# Patient Record
Sex: MALE | Race: WHITE | Employment: OTHER | URBAN - METROPOLITAN AREA
[De-identification: names, ages, dates, MRNs, and addresses within clinical notes are randomized per-mention and may not be internally consistent; named-entity substitution may affect disease eponyms.]

---

## 2019-08-09 ENCOUNTER — OFFICE VISIT (OUTPATIENT)
Dept: NEUROLOGY | Age: 29
End: 2019-08-09

## 2019-08-09 VITALS
HEIGHT: 71 IN | DIASTOLIC BLOOD PRESSURE: 72 MMHG | HEART RATE: 84 BPM | RESPIRATION RATE: 16 BRPM | BODY MASS INDEX: 25.06 KG/M2 | SYSTOLIC BLOOD PRESSURE: 116 MMHG | WEIGHT: 179 LBS | OXYGEN SATURATION: 98 %

## 2019-08-09 DIAGNOSIS — G43.011 INTRACTABLE MIGRAINE WITHOUT AURA AND WITH STATUS MIGRAINOSUS: ICD-10-CM

## 2019-08-09 DIAGNOSIS — R56.9 NEW ONSET SEIZURE (HCC): Primary | ICD-10-CM
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
## 2019-08-09 NOTE — PATIENT INSTRUCTIONS
PRESCRIPTION REFILL POLICY [facility] Neurology [facility] Statement to Patients April 1, 2014 In an effort to ensure the large volume of patient prescription refills is processed in the most efficient and expeditious manner, we are asking our patients to assist us by calling your Pharmacy for all prescription refills, this will include also your  Mail Order Pharmacy. The pharmacy will contact our office electronically to continue the refill process. Please do not wait until the last minute to call your pharmacy. We need at least 48 hours (2days) to fill prescriptions. We also encourage you to call your pharmacy before going to  your prescription to make sure it is ready. With regard to controlled substance prescription refill requests (narcotic refills) that need to be picked up at our office, we ask your cooperation by providing us with at least 72 hours (3days) notice that you will need a refill. We will not refill narcotic prescription refill requests after 4:00pm on any weekday, Monday through Thursday, or after 2:00pm on Fridays, or on the weekends. We encourage everyone to explore another way of getting your prescription refill request processed using [employer], our patient web portal through our electronic medical record system. [employer] is an efficient and effective way to communicate your medication request directly to the office and  downloadable as an kieran on your smart phone . [employer] also features a review functionality that allows you to view your medication list as well as leave messages for your physician. Are you ready to get connected? If so please review the attatched instructions or speak to any of our staff to get you set up right away! Thank you so much for your cooperation. Should you have any questions please contact our Practice Administrator. RESULT POLICY If we have ordered testing for you, know that; \"NO NEWS IS GOOD NEWS! \" It is our policy that we know longer call patients with results, nor do we  give test results over the phone. We schedule follow up appointments so that your results can be discussed in person. This allows you to address any questions you have regarding the results. If you choose to go to an imaging center outside of [facility], it is your responsibility to bring imaging report and disc to follow up appointment. If something of concern is revealed on your test, we will contact you to discuss the matter and if needed schedule a sooner follow up appointment. Additionally, results may be found by using the My Chart feature and one of our patient service representatives at the  can give you instructions on how to access this feature to utilize our electronic medical record system. Thank you for your understanding.
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
## 2019-08-30 ENCOUNTER — HOSPITAL ENCOUNTER (OUTPATIENT)
Dept: MRI IMAGING | Age: 29
Discharge: HOME | End: 2019-08-30
Attending: PSYCHIATRY & NEUROLOGY
Payer: MEDICAID

## 2019-08-30 DIAGNOSIS — R56.9 NEW ONSET SEIZURE (HCC): ICD-10-CM

## 2019-08-30 PROCEDURE — A9575 INJ GADOTERATE MEGLUMI 0.1ML: HCPCS | Performed by: PSYCHIATRY & NEUROLOGY

## 2019-08-30 PROCEDURE — 74011250636 HC RX REV CODE- 250/636: Performed by: PSYCHIATRY & NEUROLOGY

## 2019-08-30 PROCEDURE — 70553 MRI BRAIN STEM W/O & W/DYE: CPT

## 2019-08-30 RX ORDER — GADOTERATE MEGLUMINE 376.9 MG/ML
16 INJECTION INTRAVENOUS
Status: COMPLETED | OUTPATIENT
Start: 2019-08-30 | End: 2019-08-30

## 2019-08-30 RX ADMIN — GADOTERATE MEGLUMINE 16 ML: 376.9 INJECTION INTRAVENOUS at 12:50

## 2019-09-20 ENCOUNTER — OFFICE VISIT (OUTPATIENT)
Dept: NEUROLOGY | Age: 29
End: 2019-09-20

## 2019-09-20 DIAGNOSIS — R56.9 NEW ONSET SEIZURE (HCC): Primary | ICD-10-CM

## 2019-10-08 ENCOUNTER — OFFICE VISIT (OUTPATIENT)
Dept: NEUROLOGY | Age: 29
End: 2019-10-08

## 2019-10-08 DIAGNOSIS — R56.9 NEW ONSET SEIZURE (HCC): Primary | ICD-10-CM

## 2019-10-29 ENCOUNTER — OFFICE VISIT (OUTPATIENT)
Dept: NEUROLOGY | Age: 29
End: 2019-10-29

## 2019-10-29 VITALS
RESPIRATION RATE: 18 BRPM | BODY MASS INDEX: 25 KG/M2 | HEIGHT: 71 IN | SYSTOLIC BLOOD PRESSURE: 118 MMHG | WEIGHT: 178.57 LBS | DIASTOLIC BLOOD PRESSURE: 86 MMHG | OXYGEN SATURATION: 97 % | HEART RATE: 75 BPM

## 2019-10-29 DIAGNOSIS — G43.011 INTRACTABLE MIGRAINE WITHOUT AURA AND WITH STATUS MIGRAINOSUS: Primary | ICD-10-CM

## 2019-10-29 DIAGNOSIS — R40.20 LOC (LOSS OF CONSCIOUSNESS) (HCC): ICD-10-CM

## 2019-10-29 NOTE — PROCEDURES
[facility] AT [place]   EEG Report    Date of Service: 10/8/2019  Referring: Dr. Daquan Walker deprived EEG is requested in this 28-year-old man with new onset seizure to evaluate for epileptiform abnormality. Medication list is Adderall Neurontin Vyvanse Imitrex Effexor    This EEG was obtained during the awake, drowsy, and sleeping states. During wakefulness there are very brief intermittent runs of posteriorly dominant and symmetric low to medium amplitude 10 cps activities which attenuate with eye opening. Lower voltage faster frequency activities are seen symmetrically over the anterior head regions. Hyperventilation is performed for 3 minutes and little alters the tracing. Intermittent photic stimulation little alters the tracing. During drowsiness the background rhythms attenuate and are replaced with diffuse symmetric theta range activities. There is a later emergence of symmetric vertex sharp waves and sleep spindles.     Interpretation  The sleep deprived EEG recorded during the awake, drowsy, and sleeping states is normal.    Michelle Kaba MD

## 2019-10-29 NOTE — PROGRESS NOTES
Chief Complaint   Patient presents with    Results     sd and reg eeg    Headache     still 15 avg HAs per mo

## 2019-10-29 NOTE — PROCEDURES
[facility] AT [place]   EEG Report    Date of Service: 9/20/2019  Referring: Dr. Warden Enamorado    An EEG is requested in this 22-year-old with new onset seizure to evaluate for epileptiform abnormalities. Medications said to include Vyvanse Adderall Imitrex Effexor    This tracing is obtained during the awake state. During wakefulness there are brief intermittent runs of posteriorly dominant and symmetric low to medium amplitude 10 cps activities which attenuate with eye opening. Lower voltage faster frequency activities are seen symmetrically over the anterior head regions. Hyperventilation is performed for 3 minutes and little alters the tracing. Intermittent photic stimulation little alters the tracing    Sleep is not attained    Interpretation  This EEG recorded during the awake state is normal.  No epileptiform N normalities are seen.     Shirley Greco MD

## 2019-10-29 NOTE — PROGRESS NOTES
[facility] Neurology Clinics and [address] at [facility] Neurology Clinics at [address] [facility], [address] [facility] [address] [facility], [address] [place] [address]   [phone]              Chief Complaint   Patient presents with    Results     sd and reg eeg    Headache     still 15 avg HAs per mo     Current Outpatient Medications   Medication Sig Dispense Refill    Lisdexamfetamine (VYVANSE) 70 mg capsule Take 70 mg by mouth every morning.  SUMAtriptan (IMITREX) 50 mg tablet Take 50 mg by mouth once as needed for Migraine.  venlafaxine (EFFEXOR) 75 mg tablet Take 75 mg by mouth daily.  dextroamphetamine-amphetamine (ADDERALL) 10 mg tablet Take 10 mg by mouth two (2) times a day.  gabapentin (NEURONTIN) 600 mg tablet Take 1,200 mg by mouth three (3) times daily. No Known Allergies  Social History     Tobacco Use    Smoking status: Current Some Day Smoker    Smokeless tobacco: Never Used   Substance Use Topics    Alcohol use: Yes    Drug use: Yes     Types: Marijuana     Comment: \"not in a few months\"   Patient returns today for follow-up after his recent initial visit for headache and what he calls seizure. Seizure was in the context of alcohol detoxification in the past but then he had one without any inciting factor although question alcohol related. He underwent several tests including MRI of the brain which was unremarkable. This was reviewed by me today. Routine sleep deprived EEG is also reviewed and unremarkable. He has not had any further seizures    In terms of his migraines he is having at least 15 headache days per month. They are lasting hours. He is having to go to the emergency room at times about 15% he says the Imitrex will fail. 85% of time it works.       Examination  Visit Vitals  /86 (BP 1 Location: Left arm, BP Patient Position: Sitting)   Pulse 75   Resp 18   Ht 5' 11\" (1.803 m)   Wt 81 kg (178 lb 9.2 oz)   SpO2 97%   BMI 24.91 kg/m²   Awake, alert and oriented. No icterus. CN intact 2-12 without nystagmus. No pronation or drift. Resists fully in all 4 extrems. DTR symmetric in all 4 extremities. No ataxia. Steady gait. Impression/Plan  Seizure outside the context of alcohol. We will take a wait-and-see approach given normal work-up. If he has a subsequent seizure we will start antiepileptic drug    Migraine headaches intractable. Discussed migraine pathophysiology. Discussed CGRP inhibition and discussed the new CGRP inhibitors. At this point we will get him on to [facility]. Discussed administration competence side effects, potential benefits as well as studies. Multiple questions today regarding seizures, migraines, medications etc.  All those answered today in detail    Follow-up in 3 months    Luna Cuello MD    Total time: 30 min   Counseling / coordination time: 20 min   > 50% counseling / coordination?: Yes re: as documented above      This note was created using voice recognition software. Despite editing, there may be syntax errors. This note will not be viewable in [address]

## 2019-11-04 ENCOUNTER — TELEPHONE (OUTPATIENT)
Dept: NEUROLOGY | Age: 29
End: 2019-11-04

## 2019-11-04 NOTE — TELEPHONE ENCOUNTER
Prior Auth APPROVED for [facility] by Leatha FLOWERS via Cover My Meds. Effective Dates 11/04/19 - 02/02/20. Case #[id number]. Approval will be scanned into media. Please send Rx to patient's preferred pharmacy (if necessary).       CMM Key: [facility]

## 2020-01-30 ENCOUNTER — OFFICE VISIT (OUTPATIENT)
Dept: NEUROLOGY | Age: 30
End: 2020-01-30

## 2020-01-30 VITALS
RESPIRATION RATE: 16 BRPM | HEART RATE: 73 BPM | DIASTOLIC BLOOD PRESSURE: 78 MMHG | SYSTOLIC BLOOD PRESSURE: 166 MMHG | HEIGHT: 71 IN | BODY MASS INDEX: 24.64 KG/M2 | WEIGHT: 176 LBS | OXYGEN SATURATION: 99 %

## 2020-01-30 DIAGNOSIS — F32.A DEPRESSION, UNSPECIFIED DEPRESSION TYPE: ICD-10-CM

## 2020-01-30 DIAGNOSIS — G43.011 INTRACTABLE MIGRAINE WITHOUT AURA AND WITH STATUS MIGRAINOSUS: Primary | ICD-10-CM

## 2020-01-30 RX ORDER — SUMATRIPTAN 100 MG/1
100 TABLET, FILM COATED ORAL
COMMUNITY
End: 2020-02-06 | Stop reason: SDUPTHER
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
## 2020-02-06 ENCOUNTER — OFFICE VISIT (OUTPATIENT)
Dept: FAMILY MEDICINE CLINIC | Age: 30
End: 2020-02-06

## 2020-02-06 VITALS
BODY MASS INDEX: 25.48 KG/M2 | DIASTOLIC BLOOD PRESSURE: 67 MMHG | RESPIRATION RATE: 17 BRPM | HEART RATE: 100 BPM | SYSTOLIC BLOOD PRESSURE: 114 MMHG | HEIGHT: 71 IN | WEIGHT: 182 LBS | TEMPERATURE: 98.4 F | OXYGEN SATURATION: 99 %

## 2020-02-06 DIAGNOSIS — G43.809 OTHER MIGRAINE WITHOUT STATUS MIGRAINOSUS, NOT INTRACTABLE: ICD-10-CM

## 2020-02-06 DIAGNOSIS — F41.1 GAD (GENERALIZED ANXIETY DISORDER): ICD-10-CM

## 2020-02-06 DIAGNOSIS — F10.11 HISTORY OF ALCOHOL ABUSE: ICD-10-CM

## 2020-02-06 DIAGNOSIS — F98.8 ATTENTION DEFICIT DISORDER, UNSPECIFIED HYPERACTIVITY PRESENCE: ICD-10-CM

## 2020-02-06 DIAGNOSIS — Z00.00 VISIT FOR WELL MAN HEALTH CHECK: ICD-10-CM

## 2020-02-06 DIAGNOSIS — F33.1 MODERATE EPISODE OF RECURRENT MAJOR DEPRESSIVE DISORDER (HCC): Primary | ICD-10-CM

## 2020-02-06 RX ORDER — DULOXETIN HYDROCHLORIDE 30 MG/1
30 CAPSULE, DELAYED RELEASE ORAL DAILY
Qty: 30 CAP | Refills: 1 | Status: ON HOLD | OUTPATIENT
Start: 2020-02-06 | End: 2021-07-21

## 2020-02-06 RX ORDER — SUMATRIPTAN 100 MG/1
100 TABLET, FILM COATED ORAL
Qty: 15 TAB | Refills: 0 | Status: SHIPPED | OUTPATIENT
Start: 2020-02-06 | End: 2020-03-04
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
## 2020-02-06 NOTE — PROGRESS NOTES
Chief Complaint   Patient presents with   [facility] Establish Care     1. Have you been to the ER, urgent care clinic since your last visit? Hospitalized since your last visit? No    2. Have you seen or consulted any other health care providers outside of the [address] [place] [address] since your last visit? Include any pap smears or colon screening.  No

## 2020-02-07 LAB
ALBUMIN SERPL-MCNC: 4.7 G/DL (ref 4.1–5.2)
ALBUMIN/GLOB SERPL: 1.9 {RATIO} (ref 1.2–2.2)
ALP SERPL-CCNC: 76 IU/L (ref 39–117)
ALT SERPL-CCNC: 19 IU/L (ref 0–44)
AST SERPL-CCNC: 19 IU/L (ref 0–40)
BILIRUB SERPL-MCNC: <0.2 MG/DL (ref 0–1.2)
BUN SERPL-MCNC: 16 MG/DL (ref 6–20)
BUN/CREAT SERPL: 14 (ref 9–20)
CALCIUM SERPL-MCNC: 9.6 MG/DL (ref 8.7–10.2)
CHLORIDE SERPL-SCNC: 103 MMOL/L (ref 96–106)
CO2 SERPL-SCNC: 23 MMOL/L (ref 20–29)
CREAT SERPL-MCNC: 1.11 MG/DL (ref 0.76–1.27)
ERYTHROCYTE [DISTWIDTH] IN BLOOD BY AUTOMATED COUNT: 14.3 % (ref 11.6–15.4)
GLOBULIN SER CALC-MCNC: 2.5 G/DL (ref 1.5–4.5)
GLUCOSE SERPL-MCNC: 90 MG/DL (ref 65–99)
HCT VFR BLD AUTO: 42.6 % (ref 37.5–51)
HGB BLD-MCNC: 14.2 G/DL (ref 13–17.7)
MCH RBC QN AUTO: 29 PG (ref 26.6–33)
MCHC RBC AUTO-ENTMCNC: 33.3 G/DL (ref 31.5–35.7)
MCV RBC AUTO: 87 FL (ref 79–97)
PLATELET # BLD AUTO: 290 X10E3/UL (ref 150–450)
POTASSIUM SERPL-SCNC: 4.8 MMOL/L (ref 3.5–5.2)
PROT SERPL-MCNC: 7.2 G/DL (ref 6–8.5)
RBC # BLD AUTO: 4.9 X10E6/UL (ref 4.14–5.8)
SODIUM SERPL-SCNC: 142 MMOL/L (ref 134–144)
WBC # BLD AUTO: 12.9 X10E3/UL (ref 3.4–10.8)

## 2020-02-08 NOTE — PROGRESS NOTES
[address] Medicine Residency Attending Addendum:  Patient encounter was discussed on the day of the encounter with Kade Acuna MD, performing the key elements of the service. I discussed the findings, assessment and plan with the resident and agree with the resident's findings and plan as documented in the resident's note.       Raúl Gardner MD, CAQSM, RMSK

## 2020-03-04 DIAGNOSIS — G43.809 OTHER MIGRAINE WITHOUT STATUS MIGRAINOSUS, NOT INTRACTABLE: ICD-10-CM

## 2020-03-04 RX ORDER — SUMATRIPTAN 100 MG/1
100 TABLET, FILM COATED ORAL
Qty: 15 TAB | Refills: 0 | Status: SHIPPED | OUTPATIENT
Start: 2020-03-04 | End: 2020-08-28 | Stop reason: SDUPTHER

## 2020-03-05 DIAGNOSIS — G43.011 INTRACTABLE MIGRAINE WITHOUT AURA AND WITH STATUS MIGRAINOSUS: Primary | ICD-10-CM

## 2020-03-17 ENCOUNTER — TELEPHONE (OUTPATIENT)
Dept: NEUROLOGY | Age: 30
End: 2020-03-17

## 2020-03-18 NOTE — TELEPHONE ENCOUNTER
Prior Authorization APPROVED for [facility] (x1 inj/30 days) by Leatha FLOWERS via Cover [employer] Med[employer]. Effective dates 03/18/20 - 03/18/21, Case #[id number]. Approval will be scanned into media.   Pharmacy has been notified of approval.     [facility]: [id number]

## 2020-07-31 ENCOUNTER — DOCUMENTATION ONLY (OUTPATIENT)
Dept: NEUROLOGY | Age: 30
End: 2020-07-31

## 2020-07-31 ENCOUNTER — TELEPHONE (OUTPATIENT)
Dept: NEUROLOGY | Age: 30
End: 2020-07-31

## 2020-07-31 NOTE — PROGRESS NOTES
I received a telephone call noting that Mr. Suzie Beck had a seizure. He is a gentleman that I saw initially in August 2019 with a seizure without provocation. Per the history he previously had seizures with alcohol detoxification/withdrawal but the seizure he had that prompted that visit in August 2019 was without any relation to alcohol. In any regard he underwent MRI of the brain that was normal.  He had routine and sleep deprived EEGs that were normal.  He had no further events and so we took this as a first time unprovoked seizure and did not treat this with antiepileptic drugs. We did however speculate that he could in fact have epilepsy and the seizures he was attributing to alcohol withdrawal may have been related to alcohol lowering seizure threshold etc.  In any regard since he had not had any recurrence we deferred meds. He subsequently followed up with me on a couple of occasions for migraine headaches depression anxiety etc.  His last visit was January 30 of 2020. Telephone call today was that the patient had another seizure. I tried to phone him at [phone] however the call went to voicemail. I have asked my nurse, Mrs. Coley April, to reach out to the patient and fit him in for an appointment on Monday.     Dinesh Edouard MD

## 2020-07-31 NOTE — TELEPHONE ENCOUNTER
----- Message from Delia Castanon MD sent at 7/31/2020 11:23 AM EDT -----  I received notification that the patient had another seizure. I attempted to call him today at the #[phone] and received voicemail. Please try to contact the patient and work him in for an appointment on Monday.     Delia Castanon MD

## 2020-08-24 ENCOUNTER — TELEPHONE (OUTPATIENT)
Dept: NEUROLOGY | Age: 30
End: 2020-08-24

## 2020-08-24 NOTE — TELEPHONE ENCOUNTER
Mother would shagufta a call in reference to her sons medications. She is really worried about him and would like to talk to someone.

## 2020-08-25 NOTE — TELEPHONE ENCOUNTER
S/w pt, he has appt on 8/28/20 with [facility]. Notified him we are unable to s/w Gorge Mtz as he was very adamant a  few mo back we were not authorized to s/w her. He states he was going through a rough time and has since changed his mind. Notified pt we would need him to fill out new hippa form adding her name. He agreed to do so Friday at appt. [employer] link texted to pt has he was having a great deal of difficulty getting through to office.

## 2020-08-28 ENCOUNTER — OFFICE VISIT (OUTPATIENT)
Dept: NEUROLOGY | Age: 30
End: 2020-08-28
Payer: MEDICAID

## 2020-08-28 VITALS
TEMPERATURE: 97.5 F | DIASTOLIC BLOOD PRESSURE: 86 MMHG | SYSTOLIC BLOOD PRESSURE: 130 MMHG | OXYGEN SATURATION: 97 % | BODY MASS INDEX: 24.27 KG/M2 | WEIGHT: 174 LBS | HEART RATE: 119 BPM

## 2020-08-28 DIAGNOSIS — G43.011 INTRACTABLE MIGRAINE WITHOUT AURA AND WITH STATUS MIGRAINOSUS: ICD-10-CM

## 2020-08-28 DIAGNOSIS — G43.809 OTHER MIGRAINE WITHOUT STATUS MIGRAINOSUS, NOT INTRACTABLE: ICD-10-CM

## 2020-08-28 DIAGNOSIS — R42 DIZZINESS: Primary | ICD-10-CM

## 2020-08-28 PROCEDURE — 99215 OFFICE O/P EST HI 40 MIN: CPT | Performed by: NURSE PRACTITIONER

## 2020-08-28 RX ORDER — DESVENLAFAXINE SUCCINATE 50 MG/1
TABLET, EXTENDED RELEASE ORAL
Status: ON HOLD | COMMUNITY
Start: 2020-06-17 | End: 2021-07-19

## 2020-08-28 RX ORDER — METHYLPREDNISOLONE 4 MG/1
TABLET ORAL
Qty: 1 DOSE PACK | Refills: 0 | Status: ON HOLD | OUTPATIENT
Start: 2020-08-28 | End: 2021-07-21

## 2020-08-28 RX ORDER — SUMATRIPTAN 100 MG/1
TABLET, FILM COATED ORAL
Qty: 15 TAB | Refills: 5 | Status: SHIPPED | OUTPATIENT
Start: 2020-08-28 | End: 2021-06-01 | Stop reason: SDUPTHER

## 2020-08-28 RX ORDER — GALCANEZUMAB 120 MG/ML
120 INJECTION, SOLUTION SUBCUTANEOUS
Qty: 1 ML | Refills: 5 | Status: SHIPPED | OUTPATIENT
Start: 2020-08-28 | End: 2020-11-04 | Stop reason: SDUPTHER

## 2020-08-28 RX ORDER — DESVENLAFAXINE 100 MG/1
100 TABLET, EXTENDED RELEASE ORAL DAILY
COMMUNITY
Start: 2020-08-17

## 2020-08-28 NOTE — PROGRESS NOTES
Pt migraines has been really bad he is not sure if he has had a seizure. Pt wants to get back on Emgality he has had a migraine for two months.

## 2020-08-31 RX ORDER — GALCANEZUMAB 120 MG/ML
240 INJECTION, SOLUTION SUBCUTANEOUS
Qty: 2 ML | Refills: 0 | Status: SHIPPED | COMMUNITY
Start: 2020-08-31 | End: 2021-04-28 | Stop reason: ALTCHOICE

## 2020-08-31 NOTE — PROGRESS NOTES
Lily Horton is a 34 y.o. male who presents with the following  Chief Complaint   Patient presents with    Follow-up       HPI         FU for migraines, possible syncope. He states since last visit migraines have been debilitating, painful. He notices they are in the entire head. Light, sound, smell sensitivity. So bad we even passed out with pain. This was at night, not sure what else happened. He did have a seizure due to alcohol withdrawal earlier in year but nothing since. Will evaluate further as that testing was normal.     He states the imitrex works but he is taking it very frequently. He used the loading dose of Emgality and this really helped with that month but insurance messed up and has not had it since. Did notice significantly less migraines and was able to work, function better. No Known Allergies    Current Outpatient Medications   Medication Sig    galcanezumab-gnlm (Emgality Pen) 120 mg/mL injection 2 mL by SubCUTAneous route every thirty (30) days.  Desvenlafaxine 100 mg Tb24     galcanezumab-gnlm (Emgality Pen) 120 mg/mL injection 1 mL by SubCUTAneous route every thirty (30) days.  methylPREDNISolone (MEDROL DOSEPACK) 4 mg tablet Take as directed    SUMAtriptan (IMITREX) 100 mg tablet 1 at HA onset and repeat in 2 hours if needed. Max 2 in 24 hours    Lisdexamfetamine (VYVANSE) 70 mg capsule Take 70 mg by mouth every morning.  desvenlafaxine succinate (PRISTIQ) 50 mg ER tablet     DULoxetine (CYMBALTA) 30 mg capsule Take 1 Cap by mouth daily. No current facility-administered medications for this visit.         Social History     Tobacco Use   Smoking Status Current Some Day Smoker   Smokeless Tobacco Never Used       Past Medical History:   Diagnosis Date    Alcohol abuse     Anxiety and depression     Migraine     Seizures ([facility] [address]        Past Surgical History:   Procedure Laterality Date    HX ORTHOPAEDIC  2010    hip    HX ORTHOPAEDIC  2019 wrist       History reviewed. No pertinent family history. Social History     Socioeconomic History    Marital status: SINGLE     Spouse name: Not on file    Number of children: Not on file    Years of education: Not on file    Highest education level: Not on file   Tobacco Use    Smoking status: Current Some Day Smoker    Smokeless tobacco: Never Used   Substance and Sexual Activity    Alcohol use: Yes    Drug use: Yes     Types: Marijuana     Comment: \"not in a few months\"       Review of Systems   Eyes: Positive for blurred vision, double vision and photophobia. Gastrointestinal: Positive for nausea. Negative for vomiting. Neurological: Positive for dizziness and headaches. Negative for tingling, tremors, sensory change, seizures and loss of consciousness. Remainder of comprehensive review is negative. Physical Exam :    Visit Vitals  /86   Pulse (!) 119   Temp 97.5 °F (36.4 °C)   Wt 78.9 kg (174 lb)   SpO2 97%   BMI 24.27 kg/m²       General: Well defined, nourished, and groomed individual in no acute distress.    Neck: Supple, nontender, no bruits, no pain with resistance to active range of motion.    Heart: Regular rate and rhythm, no murmurs, rub, or gallop. Normal S1S2. Lungs: Clear to auscultation bilaterally with equal chest expansion, no cough, no wheeze  Musculoskeletal: Extremities revealed no edema and had full range of motion of joints.    Psych: Good mood and bright affect    NEUROLOGICAL EXAMINATION:    Mental Status: Alert and oriented to person, place, and time    Cranial Nerves:    II, III, IV, VI: Visual acuity grossly intact. Visual fields are normal.    Pupils are equal, round, and reactive to light and accommodation.    Extra-ocular movements are full and fluid. Fundoscopic exam was benign, no ptosis or nystagmus.    V-XII: Hearing is grossly intact. Facial features are symmetric, with normal sensation and strength.  The palate rises symmetrically and the tongue protrudes midline. Sternocleidomastoids 5/5. Motor Examination: Normal tone, bulk, and strength, 5/5 muscle strength throughout. Coordination: Finger to nose was normal. No resting or intention tremor    Gait and Station: Steady while walking. Normal arm swing. No pronator drift. No muscle wasting or fasiculations noted. Reflexes: DTRs 2+ throughout. Results for orders placed or performed in visit on    METABOLIC PANEL, COMPREHENSIVE   Result Value Ref Range    Glucose 90 65 - 99 mg/dL    BUN 16 6 - 20 mg/dL    Creatinine 1.11 0.76 - 1.27 mg/dL    GFR est non-AA 91 >59 mL/min/1.73    GFR est  >59 mL/min/1.73    BUN/Creatinine ratio 14 9 - 20    Sodium 142 134 - 144 mmol/L    Potassium 4.8 3.5 - 5.2 mmol/L    Chloride 103 96 - 106 mmol/L    CO2 23 20 - 29 mmol/L    Calcium 9.6 8.7 - 10.2 mg/dL    Protein, total 7.2 6.0 - 8.5 g/dL    Albumin 4.7 4.1 - 5.2 g/dL    GLOBULIN, TOTAL 2.5 1.5 - 4.5 g/dL    A-G Ratio 1.9 1.2 - 2.2    Bilirubin, total <0.2 0.0 - 1.2 mg/dL    Alk. phosphatase 76 39 - 117 IU/L    AST (SGOT) 19 0 - 40 IU/L    ALT (SGPT) 19 0 - 44 IU/L   CBC W/O DIFF   Result Value Ref Range    WBC 12.9 (H) 3.4 - 10.8 x10E3/uL    RBC 4.90 4.14 - 5.80 x10E6/uL    HGB 14.2 13.0 - 17.7 g/dL    HCT 42.6 37.5 - 51.0 %    MCV 87 79 - 97 fL    MCH 29.0 26.6 - 33.0 pg    MCHC 33.3 31.5 - 35.7 g/dL    RDW 14.3 11.6 - 15.4 %    PLATELET 976 544 - 293 x10E3/uL       Orders Placed This Encounter    NEURO EEG 24 HR     Standing Status:   Future     Standing Expiration Date:   2021     Order Specific Question:   Reason for Exam:     Answer:   seizure activity    Desvenlafaxine 100 mg Tb24    desvenlafaxine succinate (PRISTIQ) 50 mg ER tablet    galcanezumab-gnlm (Emgality Pen) 120 mg/mL injection     Si mL by SubCUTAneous route every thirty (30) days.      Dispense:  1 mL     Refill:  5    methylPREDNISolone (MEDROL DOSEPACK) 4 mg tablet     Sig: Take as directed     Dispense: 1 Dose Pack     Refill:  0    SUMAtriptan (IMITREX) 100 mg tablet     Si at HA onset and repeat in 2 hours if needed. Max 2 in 24 hours     Dispense:  15 Tab     Refill:  5    galcanezumab-gnlm (Emgality Pen) 120 mg/mL injection     Si mL by SubCUTAneous route every thirty (30) days. Dispense:  2 mL     Refill:  0       1. Dizziness    2. Intractable migraine without aura and with status migrainosus    3. Other migraine without status migrainosus, not intractable        re-initiate Emgality with loading dose then 1 syringe monthly thereafter for prevention of migraines. Keep Imitrex for PRN rescue and discussed overuse. Try Medrol pack to break up the HA cycle.      24 hour EEG as normal regular EEG To look at brainwaves            This note will not be viewable in [employer]

## 2020-11-04 DIAGNOSIS — G43.011 INTRACTABLE MIGRAINE WITHOUT AURA AND WITH STATUS MIGRAINOSUS: ICD-10-CM

## 2020-11-04 RX ORDER — GALCANEZUMAB 120 MG/ML
120 INJECTION, SOLUTION SUBCUTANEOUS
Qty: 1 ML | Refills: 5 | Status: SHIPPED | OUTPATIENT
Start: 2020-11-04 | End: 2021-04-28 | Stop reason: ALTCHOICE

## 2020-11-30 DIAGNOSIS — G43.011 INTRACTABLE MIGRAINE WITHOUT AURA AND WITH STATUS MIGRAINOSUS: ICD-10-CM

## 2020-11-30 RX ORDER — SUMATRIPTAN 50 MG/1
TABLET, FILM COATED ORAL
Qty: 9 TAB | Refills: 3 | Status: SHIPPED | OUTPATIENT
Start: 2020-11-30 | End: 2021-03-29

## 2021-01-27 ENCOUNTER — TELEPHONE (OUTPATIENT)
Dept: NEUROLOGY | Age: 31
End: 2021-01-27

## 2021-01-27 RX ORDER — GALCANEZUMAB 120 MG/ML
2 INJECTION, SOLUTION SUBCUTANEOUS ONCE
Qty: 2 EACH | Refills: 0 | Status: SHIPPED | COMMUNITY
Start: 2021-01-27 | End: 2021-01-27

## 2021-03-29 DIAGNOSIS — G43.011 INTRACTABLE MIGRAINE WITHOUT AURA AND WITH STATUS MIGRAINOSUS: ICD-10-CM

## 2021-03-29 RX ORDER — SUMATRIPTAN 50 MG/1
TABLET, FILM COATED ORAL
Qty: 9 TAB | Refills: 3 | Status: SHIPPED | OUTPATIENT
Start: 2021-03-29 | End: 2021-05-27 | Stop reason: DRUGHIGH

## 2021-04-28 ENCOUNTER — TELEPHONE (OUTPATIENT)
Dept: NEUROLOGY | Age: 31
End: 2021-04-28

## 2021-04-28 DIAGNOSIS — G43.919 INTRACTABLE MIGRAINE WITHOUT STATUS MIGRAINOSUS, UNSPECIFIED MIGRAINE TYPE: Primary | ICD-10-CM

## 2021-04-28 RX ORDER — ERENUMAB-AOOE 140 MG/ML
140 INJECTION, SOLUTION SUBCUTANEOUS
Qty: 1 EACH | Refills: 5 | Status: SHIPPED | OUTPATIENT
Start: 2021-04-28 | End: 2021-06-08 | Stop reason: SDUPTHER

## 2021-04-28 NOTE — TELEPHONE ENCOUNTER
----- Message from [employer] sent at 4/28/2021 11:24 AM EDT -----  Regarding: Dr. Alesia Sylvester Message/Vendor Calls    Caller's first and last name: Pt      Reason for call: increasing migraines      Callback required yes/no and why: Yes      Best contact number(s): [phone]      Details to clarify the request: Pt is calling c/o increased migraines. He said that even though he is currently taking the Emgality, it doesn't have the same effect it did when he first started it. He would like to know if there is something else he can try. Please advise.        [employer]

## 2021-04-28 NOTE — TELEPHONE ENCOUNTER
Sure can try Aimovig- a differnet type of 1 X a month injection that is similar but works at a different place. Let me know if he wants to try that.    Can also get something to help with as needed rescue like ubrelvy - I will send that in too if he would like

## 2021-04-28 NOTE — TELEPHONE ENCOUNTER
Patient is interested in trying new CGRP and rescue medication. Patient understands that he must wait 28-30 days after his last injection to start [address] [place] [address]. BROWN Zayas made aware.

## 2021-04-28 NOTE — TELEPHONE ENCOUNTER
BROWN Monet, April M, LPN   Caller: Unspecified (Today,  2:30 PM)             CAN YOU LET HIM know I sent in Aimovig 140 mg. Can get a copay card offline and use the injection when he gets it. No need to wait. Also sent in Yovani Killer for PRN use. To break up HA cycle. Previous Messages         Called and spoke to patient. Discussed Dimitri's recommendations. Patient states understanding.

## 2021-05-26 ENCOUNTER — TELEPHONE (OUTPATIENT)
Dept: NEUROLOGY | Age: 31
End: 2021-05-26

## 2021-05-26 DIAGNOSIS — G43.809 OTHER MIGRAINE WITHOUT STATUS MIGRAINOSUS, NOT INTRACTABLE: ICD-10-CM

## 2021-05-26 NOTE — TELEPHONE ENCOUNTER
----- Message from Richa Kwon sent at 5/26/2021  3:18 PM EDT -----  Regarding: BROWN Zayas/telephone  General Message/Vendor Calls    Caller's first and last name:      Reason for call: The pt asked for a call back from the nurse as soon as possible. He stated his migraines are getting worse. Callback required yes/no and why:Yes.       Best contact number(s):[phone]

## 2021-05-27 RX ORDER — SUMATRIPTAN 100 MG/1
TABLET, FILM COATED ORAL
Qty: 15 TABLET | Refills: 5 | Status: CANCELLED | OUTPATIENT
Start: 2021-05-27

## 2021-05-27 NOTE — TELEPHONE ENCOUNTER
S/w pt, gave number for dispatch health, let him know PA request was sent to [facility], and pt is requesting rx for sumatriptan 100 mg as this is only thing that seems to work for him

## 2021-05-28 NOTE — TELEPHONE ENCOUNTER
RE [facility]    STATUS APPROVED [facility] Key: [id number] - PA Case ID: [id number] A Case: [id number], Status: Approved, Coverage Starts on: 4/6/2021 12:00:00 AM, Coverage Ends on: 4/6/2022 12:00:00 AM.    RE [facility]   STATUS Message from Plan  Available without authorization.

## 2021-06-01 DIAGNOSIS — G43.919 INTRACTABLE MIGRAINE WITHOUT STATUS MIGRAINOSUS, UNSPECIFIED MIGRAINE TYPE: ICD-10-CM

## 2021-06-01 DIAGNOSIS — G43.809 OTHER MIGRAINE WITHOUT STATUS MIGRAINOSUS, NOT INTRACTABLE: ICD-10-CM

## 2021-06-01 RX ORDER — SUMATRIPTAN 100 MG/1
TABLET, FILM COATED ORAL
Qty: 15 TABLET | Refills: 5 | Status: SHIPPED | OUTPATIENT
Start: 2021-06-01 | End: 2021-10-21 | Stop reason: SDUPTHER

## 2021-06-01 NOTE — TELEPHONE ENCOUNTER
Let him know that Eileen Funk was approved and sent to pharmacy       Is he still having issues with injetion?

## 2021-06-08 DIAGNOSIS — G43.919 INTRACTABLE MIGRAINE WITHOUT STATUS MIGRAINOSUS, UNSPECIFIED MIGRAINE TYPE: ICD-10-CM

## 2021-06-08 RX ORDER — ERENUMAB-AOOE 140 MG/ML
140 INJECTION, SOLUTION SUBCUTANEOUS
Qty: 1 EACH | Refills: 5 | Status: SHIPPED | OUTPATIENT
Start: 2021-06-08 | End: 2021-10-04 | Stop reason: ALTCHOICE

## 2021-07-18 ENCOUNTER — HOSPITAL ENCOUNTER (INPATIENT)
Age: 31
LOS: 3 days | Discharge: HOME | DRG: 284 | End: 2021-07-21
Attending: EMERGENCY MEDICINE | Admitting: FAMILY MEDICINE
Payer: MEDICAID

## 2021-07-18 ENCOUNTER — APPOINTMENT (OUTPATIENT)
Dept: ULTRASOUND IMAGING | Age: 31
DRG: 284 | End: 2021-07-18
Attending: EMERGENCY MEDICINE
Payer: MEDICAID

## 2021-07-18 ENCOUNTER — APPOINTMENT (OUTPATIENT)
Dept: CT IMAGING | Age: 31
DRG: 284 | End: 2021-07-18
Attending: EMERGENCY MEDICINE
Payer: MEDICAID

## 2021-07-18 DIAGNOSIS — K83.8 COMMON BILE DUCT DILATATION: ICD-10-CM

## 2021-07-18 DIAGNOSIS — R10.11 RUQ PAIN: ICD-10-CM

## 2021-07-18 DIAGNOSIS — R10.11 RIGHT UPPER QUADRANT ABDOMINAL PAIN: ICD-10-CM

## 2021-07-18 DIAGNOSIS — M54.9 OTHER ACUTE BACK PAIN: Primary | ICD-10-CM

## 2021-07-18 PROBLEM — K80.50 CHOLEDOCHOLITHIASIS: Status: ACTIVE | Noted: 2021-07-18

## 2021-07-18 LAB
ALBUMIN SERPL-MCNC: 4.4 G/DL (ref 3.5–5)
ALBUMIN/GLOB SERPL: 1.1 {RATIO} (ref 1.1–2.2)
ALP SERPL-CCNC: 146 U/L (ref 45–117)
ALT SERPL-CCNC: 99 U/L (ref 12–78)
ANION GAP SERPL CALC-SCNC: 7 MMOL/L (ref 5–15)
AST SERPL-CCNC: 136 U/L (ref 15–37)
BASOPHILS # BLD: 0.1 K/UL (ref 0–0.1)
BASOPHILS NFR BLD: 1 % (ref 0–1)
BILIRUB SERPL-MCNC: 1 MG/DL (ref 0.2–1)
BUN SERPL-MCNC: 17 MG/DL (ref 6–20)
BUN/CREAT SERPL: 17 (ref 12–20)
CALCIUM SERPL-MCNC: 9.7 MG/DL (ref 8.5–10.1)
CHLORIDE SERPL-SCNC: 106 MMOL/L (ref 97–108)
CO2 SERPL-SCNC: 25 MMOL/L (ref 21–32)
COMMENT, HOLDF: NORMAL
CREAT SERPL-MCNC: 1.02 MG/DL (ref 0.7–1.3)
DIFFERENTIAL METHOD BLD: ABNORMAL
EOSINOPHIL # BLD: 0.2 K/UL (ref 0–0.4)
EOSINOPHIL NFR BLD: 2 % (ref 0–7)
ERYTHROCYTE [DISTWIDTH] IN BLOOD BY AUTOMATED COUNT: 14 % (ref 11.5–14.5)
ETHANOL SERPL-MCNC: <10 MG/DL
GLOBULIN SER CALC-MCNC: 4 G/DL (ref 2–4)
GLUCOSE SERPL-MCNC: 126 MG/DL (ref 65–100)
HCT VFR BLD AUTO: 42.8 % (ref 36.6–50.3)
HGB BLD-MCNC: 15.2 G/DL (ref 12.1–17)
IMM GRANULOCYTES # BLD AUTO: 0.1 K/UL (ref 0–0.04)
IMM GRANULOCYTES NFR BLD AUTO: 1 % (ref 0–0.5)
LYMPHOCYTES # BLD: 2.1 K/UL (ref 0.8–3.5)
LYMPHOCYTES NFR BLD: 17 % (ref 12–49)
MCH RBC QN AUTO: 29.3 PG (ref 26–34)
MCHC RBC AUTO-ENTMCNC: 35.5 G/DL (ref 30–36.5)
MCV RBC AUTO: 82.6 FL (ref 80–99)
MONOCYTES # BLD: 0.7 K/UL (ref 0–1)
MONOCYTES NFR BLD: 6 % (ref 5–13)
NEUTS SEG # BLD: 9.6 K/UL (ref 1.8–8)
NEUTS SEG NFR BLD: 73 % (ref 32–75)
NRBC # BLD: 0 K/UL (ref 0–0.01)
NRBC BLD-RTO: 0 PER 100 WBC
PLATELET # BLD AUTO: 296 K/UL (ref 150–400)
PMV BLD AUTO: 9.9 FL (ref 8.9–12.9)
POTASSIUM SERPL-SCNC: 3.6 MMOL/L (ref 3.5–5.1)
PROT SERPL-MCNC: 8.4 G/DL (ref 6.4–8.2)
RBC # BLD AUTO: 5.18 M/UL (ref 4.1–5.7)
SAMPLES BEING HELD,HOLD: NORMAL
SODIUM SERPL-SCNC: 138 MMOL/L (ref 136–145)
WBC # BLD AUTO: 12.7 K/UL (ref 4.1–11.1)

## 2021-07-18 PROCEDURE — 96375 TX/PRO/DX INJ NEW DRUG ADDON: CPT

## 2021-07-18 PROCEDURE — 36415 COLL VENOUS BLD VENIPUNCTURE: CPT

## 2021-07-18 PROCEDURE — 74011000258 HC RX REV CODE- 258: Performed by: FAMILY MEDICINE

## 2021-07-18 PROCEDURE — 74176 CT ABD & PELVIS W/O CONTRAST: CPT

## 2021-07-18 PROCEDURE — 74011250636 HC RX REV CODE- 250/636: Performed by: FAMILY MEDICINE

## 2021-07-18 PROCEDURE — 96374 THER/PROPH/DIAG INJ IV PUSH: CPT

## 2021-07-18 PROCEDURE — 76705 ECHO EXAM OF ABDOMEN: CPT

## 2021-07-18 PROCEDURE — 99221 1ST HOSP IP/OBS SF/LOW 40: CPT | Performed by: SURGERY

## 2021-07-18 PROCEDURE — 96376 TX/PRO/DX INJ SAME DRUG ADON: CPT

## 2021-07-18 PROCEDURE — 83690 ASSAY OF LIPASE: CPT

## 2021-07-18 PROCEDURE — 85025 COMPLETE CBC W/AUTO DIFF WBC: CPT

## 2021-07-18 PROCEDURE — 99285 EMERGENCY DEPT VISIT HI MDM: CPT

## 2021-07-18 PROCEDURE — 82077 ASSAY SPEC XCP UR&BREATH IA: CPT

## 2021-07-18 PROCEDURE — 65660000000 HC RM CCU STEPDOWN

## 2021-07-18 PROCEDURE — 74011250636 HC RX REV CODE- 250/636: Performed by: EMERGENCY MEDICINE

## 2021-07-18 PROCEDURE — 80053 COMPREHEN METABOLIC PANEL: CPT

## 2021-07-18 RX ORDER — SODIUM CHLORIDE 0.9 % (FLUSH) 0.9 %
5-40 SYRINGE (ML) INJECTION EVERY 8 HOURS
Status: DISCONTINUED | OUTPATIENT
Start: 2021-07-18 | End: 2021-07-21 | Stop reason: HOSPADM

## 2021-07-18 RX ORDER — MORPHINE SULFATE 4 MG/ML
4 INJECTION INTRAVENOUS
Status: DISCONTINUED | OUTPATIENT
Start: 2021-07-18 | End: 2021-07-19

## 2021-07-18 RX ORDER — SODIUM CHLORIDE 0.9 % (FLUSH) 0.9 %
5-40 SYRINGE (ML) INJECTION AS NEEDED
Status: DISCONTINUED | OUTPATIENT
Start: 2021-07-18 | End: 2021-07-19

## 2021-07-18 RX ORDER — ONDANSETRON 2 MG/ML
4 INJECTION INTRAMUSCULAR; INTRAVENOUS
Status: COMPLETED | OUTPATIENT
Start: 2021-07-18 | End: 2021-07-18

## 2021-07-18 RX ORDER — SODIUM CHLORIDE AND POTASSIUM CHLORIDE .9; .15 G/100ML; G/100ML
SOLUTION INTRAVENOUS CONTINUOUS
Status: DISPENSED | OUTPATIENT
Start: 2021-07-18 | End: 2021-07-19

## 2021-07-18 RX ORDER — MORPHINE SULFATE 4 MG/ML
8 INJECTION INTRAVENOUS
Status: COMPLETED | OUTPATIENT
Start: 2021-07-18 | End: 2021-07-18

## 2021-07-18 RX ADMIN — MORPHINE SULFATE 8 MG: 4 INJECTION, SOLUTION INTRAMUSCULAR; INTRAVENOUS at 20:01

## 2021-07-18 RX ADMIN — ONDANSETRON 4 MG: 2 INJECTION INTRAMUSCULAR; INTRAVENOUS at 18:57

## 2021-07-18 RX ADMIN — MORPHINE SULFATE 8 MG: 4 INJECTION, SOLUTION INTRAMUSCULAR; INTRAVENOUS at 18:58

## 2021-07-18 RX ADMIN — PIPERACILLIN AND TAZOBACTAM 3.38 G: 3; .375 INJECTION, POWDER, LYOPHILIZED, FOR SOLUTION INTRAVENOUS at 23:03

## 2021-07-18 RX ADMIN — SODIUM CHLORIDE 1000 ML: 9 INJECTION, SOLUTION INTRAVENOUS at 18:57

## 2021-07-18 NOTE — ED PROVIDER NOTES
73-year-old male, who tells me he has a history of anxiety, also has recorded history of alcohol abuse, migraines, and seizures, was brought here by ambulance for right back pain/flank pain that started a couple days ago, but got intensely worse over the last couple hours. Has had some decreased urination. No bowel movement today or yesterday. No nausea or vomiting. No fever. No cough or trouble breathing. No chest pain. No history of abdominal surgeries. He says he has not been using any drugs or alcohol. Past Medical History:   Diagnosis Date    Alcohol abuse     Anxiety and depression     Migraine     Seizures ([facility] [address]        Past Surgical History:   Procedure Laterality Date    HX ORTHOPAEDIC  2010    hip    HX ORTHOPAEDIC  2019    wrist         No family history on file. Social History     Socioeconomic History    Marital status: SINGLE     Spouse name: Not on file    Number of children: Not on file    Years of education: Not on file    Highest education level: Not on file   Occupational History    Not on file   Tobacco Use    Smoking status: Current Some Day Smoker    Smokeless tobacco: Never Used   Substance and Sexual Activity    Alcohol use: Yes    Drug use: Yes     Types: Marijuana     Comment: \"not in a few months\"    Sexual activity: Not on file   Other Topics Concern    Not on file   Social History Narrative    Not on file     Social Determinants of Health     Financial Resource Strain:     Difficulty of Paying Living Expenses:    Food Insecurity:     Worried About Running Out of Food in the Last Year:     [address] in the Last Year:    Transportation Needs:     Lack of Transportation (Medical):      Lack of Transportation (Non-Medical):    Physical Activity:     Days of Exercise per Week:     Minutes of Exercise per Session:    Stress:     Feeling of Stress :    Social Connections:     Frequency of Communication with Friends and Family:     Frequency of Social Gatherings with Friends and Family:     Attends Taoism Services:     Active Member of Clubs or Organizations:     Attends Club or Organization Meetings:     Marital Status:    Intimate Partner Violence:     Fear of Current or Ex-Partner:     Emotionally Abused:     Physically Abused:     Sexually Abused: ALLERGIES: Patient has no known allergies. Review of Systems   Constitutional: Negative for fever. HENT: Negative for trouble swallowing. Eyes: Negative for visual disturbance. Respiratory: Negative for cough. Cardiovascular: Negative for chest pain. Gastrointestinal: Negative for abdominal pain. Genitourinary: Positive for difficulty urinating and flank pain. Musculoskeletal: Negative for gait problem. Skin: Negative for rash. Neurological: Negative for headaches. Hematological: Does not bruise/bleed easily. Psychiatric/Behavioral: Negative for sleep disturbance. There were no vitals filed for this visit. Physical Exam  Constitutional:       Appearance: Normal appearance. HENT:      Head: Normocephalic. Nose: Nose normal.      Mouth/Throat:      Mouth: Mucous membranes are moist.   Eyes:      Extraocular Movements: Extraocular movements intact. Conjunctiva/sclera: Conjunctivae normal.   Cardiovascular:      Rate and Rhythm: Normal rate. Pulmonary:      Effort: Pulmonary effort is normal. No respiratory distress. Abdominal:      General: Abdomen is flat. Palpations: Abdomen is soft. Tenderness: There is abdominal tenderness. There is right CVA tenderness. Musculoskeletal:         General: Normal range of motion. Skin:     Findings: No rash. Neurological:      General: No focal deficit present. Mental Status: He is alert.    Psychiatric:         Behavior: Behavior normal.          MDM  Number of Diagnoses or Management Options  Common bile duct dilatation  Other acute back pain  RUQ pain  Diagnosis management comments: Flank pain and decreased urination we may think this could be nephrolithiasis, but CT scan did not show that. He has abnormal biliary imaging on the scan and ongoing severe pain, so I have ordered another 8 mg of morphine and right upper quadrant ultrasound. Patient says he has a very high tolerance as he is an alcoholic, but he has not had a drink in 4 years. Perfect Serve Consult for Admission  10:19 PM    ED Room Number: ER05/05  Patient Name and age:  Kaylene Perry 31 y.o.  male  Working Diagnosis: Other acute back pain  (primary encounter diagnosis)  RUQ pain  Common bile duct dilatation    COVID-19 Suspicion:  no  Sepsis present:  no  Reassessment needed: no  Code Status:  Full Code  Readmission: no  Isolation Requirements:  no  Recommended Level of Care:  med/surg  Department:[facility] Adult ED - 21   Other: Patient came in with acute onset flank pain back pain that seems like ureterolithiasis, but CT showed possible gallbladder disease. Ultrasound was equivocal for cholecystitis, but thought this was possibly choledocholithiasis. Dr. Abrahan Mcdaniel with surgery recommended I speak with GI.  GI recommended I get an MRCP and admit to you. Patient took 8 mg of morphine when he first got here with very little change in his pain. The second 8 mg of morphine seems to help with the pain, but he says he still has some. He is resting comfortably now.            Procedures

## 2021-07-18 NOTE — ED TRIAGE NOTES
Patient arrived with EMS with chief complaint of flank pain and trouble with urination. Per patient, flank pain onset \"a couple of days ago\" and trouble with urination onset today.

## 2021-07-19 ENCOUNTER — APPOINTMENT (OUTPATIENT)
Dept: MRI IMAGING | Age: 31
DRG: 284 | End: 2021-07-19
Attending: EMERGENCY MEDICINE
Payer: MEDICAID

## 2021-07-19 PROBLEM — R74.01 TRANSAMINITIS: Status: ACTIVE | Noted: 2021-07-19

## 2021-07-19 PROBLEM — K80.50 CHOLEDOCHOLITHIASIS: Status: RESOLVED | Noted: 2021-07-18 | Resolved: 2021-07-19

## 2021-07-19 PROBLEM — K85.10 GALLSTONE PANCREATITIS: Status: ACTIVE | Noted: 2021-07-19

## 2021-07-19 PROBLEM — K85.90 ACUTE PANCREATITIS: Status: ACTIVE | Noted: 2021-07-19

## 2021-07-19 PROBLEM — K85.10 GALLSTONE PANCREATITIS: Status: RESOLVED | Noted: 2021-07-19 | Resolved: 2021-07-19

## 2021-07-19 LAB
COVID-19 RAPID TEST, COVR: NOT DETECTED
LIPASE SERPL-CCNC: 588 U/L (ref 73–393)
SOURCE, COVRS: NORMAL

## 2021-07-19 PROCEDURE — 74011250636 HC RX REV CODE- 250/636

## 2021-07-19 PROCEDURE — 74011250637 HC RX REV CODE- 250/637: Performed by: NURSE PRACTITIONER

## 2021-07-19 PROCEDURE — C9113 INJ PANTOPRAZOLE SODIUM, VIA: HCPCS | Performed by: NURSE PRACTITIONER

## 2021-07-19 PROCEDURE — 74011250636 HC RX REV CODE- 250/636: Performed by: SURGERY

## 2021-07-19 PROCEDURE — 74011000258 HC RX REV CODE- 258: Performed by: FAMILY MEDICINE

## 2021-07-19 PROCEDURE — 80074 ACUTE HEPATITIS PANEL: CPT

## 2021-07-19 PROCEDURE — 74011250636 HC RX REV CODE- 250/636: Performed by: NURSE PRACTITIONER

## 2021-07-19 PROCEDURE — 74011000250 HC RX REV CODE- 250: Performed by: NURSE PRACTITIONER

## 2021-07-19 PROCEDURE — 77030021566 MRI ABD W MRCP W WO CONT

## 2021-07-19 PROCEDURE — 87635 SARS-COV-2 COVID-19 AMP PRB: CPT

## 2021-07-19 PROCEDURE — 99232 SBSQ HOSP IP/OBS MODERATE 35: CPT | Performed by: SURGERY

## 2021-07-19 PROCEDURE — 74011250637 HC RX REV CODE- 250/637: Performed by: SPECIALIST

## 2021-07-19 PROCEDURE — 74011250637 HC RX REV CODE- 250/637: Performed by: SURGERY

## 2021-07-19 PROCEDURE — 36415 COLL VENOUS BLD VENIPUNCTURE: CPT

## 2021-07-19 PROCEDURE — A9585 GADOBUTROL INJECTION: HCPCS

## 2021-07-19 PROCEDURE — 65660000000 HC RM CCU STEPDOWN

## 2021-07-19 PROCEDURE — 51798 US URINE CAPACITY MEASURE: CPT

## 2021-07-19 PROCEDURE — 74011250636 HC RX REV CODE- 250/636: Performed by: FAMILY MEDICINE

## 2021-07-19 RX ORDER — MORPHINE SULFATE 2 MG/ML
2 INJECTION, SOLUTION INTRAMUSCULAR; INTRAVENOUS
Status: CANCELLED | OUTPATIENT
Start: 2021-07-19

## 2021-07-19 RX ORDER — GABAPENTIN 400 MG/1
800 CAPSULE ORAL
Status: DISCONTINUED | OUTPATIENT
Start: 2021-07-19 | End: 2021-07-21 | Stop reason: HOSPADM

## 2021-07-19 RX ORDER — DIPHENHYDRAMINE HYDROCHLORIDE 50 MG/ML
12.5 INJECTION, SOLUTION INTRAMUSCULAR; INTRAVENOUS AS NEEDED
Status: CANCELLED | OUTPATIENT
Start: 2021-07-19 | End: 2021-07-19

## 2021-07-19 RX ORDER — FENTANYL CITRATE 50 UG/ML
50 INJECTION, SOLUTION INTRAMUSCULAR; INTRAVENOUS AS NEEDED
Status: CANCELLED | OUTPATIENT
Start: 2021-07-19

## 2021-07-19 RX ORDER — ONDANSETRON 2 MG/ML
4 INJECTION INTRAMUSCULAR; INTRAVENOUS AS NEEDED
Status: CANCELLED | OUTPATIENT
Start: 2021-07-19

## 2021-07-19 RX ORDER — OXYCODONE HYDROCHLORIDE 5 MG/1
5 TABLET ORAL AS NEEDED
Status: CANCELLED | OUTPATIENT
Start: 2021-07-19

## 2021-07-19 RX ORDER — SODIUM CHLORIDE 0.9 % (FLUSH) 0.9 %
5-40 SYRINGE (ML) INJECTION AS NEEDED
Status: CANCELLED | OUTPATIENT
Start: 2021-07-19

## 2021-07-19 RX ORDER — SODIUM CHLORIDE AND POTASSIUM CHLORIDE .9; .15 G/100ML; G/100ML
SOLUTION INTRAVENOUS CONTINUOUS
Status: DISCONTINUED | OUTPATIENT
Start: 2021-07-19 | End: 2021-07-21

## 2021-07-19 RX ORDER — MIDAZOLAM HYDROCHLORIDE 5 MG/ML
0.5 INJECTION INTRAMUSCULAR; INTRAVENOUS
Status: CANCELLED | OUTPATIENT
Start: 2021-07-19

## 2021-07-19 RX ORDER — GUANFACINE HYDROCHLORIDE 1 MG/1
2 TABLET ORAL
Status: DISCONTINUED | OUTPATIENT
Start: 2021-07-19 | End: 2021-07-21 | Stop reason: HOSPADM

## 2021-07-19 RX ORDER — FENTANYL CITRATE 50 UG/ML
25 INJECTION, SOLUTION INTRAMUSCULAR; INTRAVENOUS
Status: CANCELLED | OUTPATIENT
Start: 2021-07-19

## 2021-07-19 RX ORDER — ONDANSETRON 2 MG/ML
4 INJECTION INTRAMUSCULAR; INTRAVENOUS
Status: DISCONTINUED | OUTPATIENT
Start: 2021-07-19 | End: 2021-07-21 | Stop reason: HOSPADM

## 2021-07-19 RX ORDER — HYDROMORPHONE HYDROCHLORIDE 1 MG/ML
0.2 INJECTION, SOLUTION INTRAMUSCULAR; INTRAVENOUS; SUBCUTANEOUS
Status: CANCELLED | OUTPATIENT
Start: 2021-07-19

## 2021-07-19 RX ORDER — MIDAZOLAM HYDROCHLORIDE 5 MG/ML
1 INJECTION INTRAMUSCULAR; INTRAVENOUS AS NEEDED
Status: CANCELLED | OUTPATIENT
Start: 2021-07-19

## 2021-07-19 RX ORDER — SODIUM CHLORIDE, SODIUM LACTATE, POTASSIUM CHLORIDE, CALCIUM CHLORIDE 600; 310; 30; 20 MG/100ML; MG/100ML; MG/100ML; MG/100ML
125 INJECTION, SOLUTION INTRAVENOUS CONTINUOUS
Status: CANCELLED | OUTPATIENT
Start: 2021-07-19

## 2021-07-19 RX ORDER — HYDROMORPHONE HYDROCHLORIDE 1 MG/ML
1 INJECTION, SOLUTION INTRAMUSCULAR; INTRAVENOUS; SUBCUTANEOUS
Status: DISCONTINUED | OUTPATIENT
Start: 2021-07-19 | End: 2021-07-21

## 2021-07-19 RX ORDER — SODIUM CHLORIDE, SODIUM LACTATE, POTASSIUM CHLORIDE, CALCIUM CHLORIDE 600; 310; 30; 20 MG/100ML; MG/100ML; MG/100ML; MG/100ML
25 INJECTION, SOLUTION INTRAVENOUS CONTINUOUS
Status: CANCELLED | OUTPATIENT
Start: 2021-07-19 | End: 2021-07-20

## 2021-07-19 RX ORDER — SODIUM CHLORIDE 0.9 % (FLUSH) 0.9 %
5-40 SYRINGE (ML) INJECTION EVERY 8 HOURS
Status: CANCELLED | OUTPATIENT
Start: 2021-07-19

## 2021-07-19 RX ORDER — ACETAMINOPHEN 325 MG/1
650 TABLET ORAL ONCE
Status: CANCELLED | OUTPATIENT
Start: 2021-07-19 | End: 2021-07-19

## 2021-07-19 RX ORDER — POLYETHYLENE GLYCOL 3350 17 G/17G
17 POWDER, FOR SOLUTION ORAL 2 TIMES DAILY
Status: DISCONTINUED | OUTPATIENT
Start: 2021-07-19 | End: 2021-07-21 | Stop reason: HOSPADM

## 2021-07-19 RX ORDER — ENOXAPARIN SODIUM 100 MG/ML
40 INJECTION SUBCUTANEOUS EVERY 24 HOURS
Status: DISCONTINUED | OUTPATIENT
Start: 2021-07-19 | End: 2021-07-21 | Stop reason: HOSPADM

## 2021-07-19 RX ORDER — TAMSULOSIN HYDROCHLORIDE 0.4 MG/1
0.4 CAPSULE ORAL DAILY
Status: DISCONTINUED | OUTPATIENT
Start: 2021-07-19 | End: 2021-07-21 | Stop reason: HOSPADM

## 2021-07-19 RX ORDER — OXYCODONE AND ACETAMINOPHEN 5; 325 MG/1; MG/1
1 TABLET ORAL
Status: DISCONTINUED | OUTPATIENT
Start: 2021-07-19 | End: 2021-07-21

## 2021-07-19 RX ORDER — LIDOCAINE HYDROCHLORIDE 10 MG/ML
0.1 INJECTION, SOLUTION EPIDURAL; INFILTRATION; INTRACAUDAL; PERINEURAL AS NEEDED
Status: CANCELLED | OUTPATIENT
Start: 2021-07-19

## 2021-07-19 RX ORDER — OXYCODONE AND ACETAMINOPHEN 10; 325 MG/1; MG/1
1 TABLET ORAL
Status: DISCONTINUED | OUTPATIENT
Start: 2021-07-19 | End: 2021-07-21 | Stop reason: HOSPADM

## 2021-07-19 RX ORDER — SODIUM CHLORIDE 9 MG/ML
25 INJECTION, SOLUTION INTRAVENOUS CONTINUOUS
Status: CANCELLED | OUTPATIENT
Start: 2021-07-19 | End: 2021-07-20

## 2021-07-19 RX ORDER — FACIAL-BODY WIPES
10 EACH TOPICAL DAILY PRN
Status: DISCONTINUED | OUTPATIENT
Start: 2021-07-19 | End: 2021-07-21 | Stop reason: HOSPADM

## 2021-07-19 RX ORDER — MIRTAZAPINE 15 MG/1
30 TABLET, FILM COATED ORAL
Status: DISCONTINUED | OUTPATIENT
Start: 2021-07-19 | End: 2021-07-21 | Stop reason: HOSPADM

## 2021-07-19 RX ADMIN — TAMSULOSIN HYDROCHLORIDE 0.4 MG: 0.4 CAPSULE ORAL at 17:29

## 2021-07-19 RX ADMIN — ENOXAPARIN SODIUM 40 MG: 40 INJECTION SUBCUTANEOUS at 13:48

## 2021-07-19 RX ADMIN — SODIUM CHLORIDE 40 MG: 9 INJECTION INTRAMUSCULAR; INTRAVENOUS; SUBCUTANEOUS at 11:27

## 2021-07-19 RX ADMIN — HYDROMORPHONE HYDROCHLORIDE 1 MG: 1 INJECTION, SOLUTION INTRAMUSCULAR; INTRAVENOUS; SUBCUTANEOUS at 19:44

## 2021-07-19 RX ADMIN — HYDROMORPHONE HYDROCHLORIDE 1 MG: 1 INJECTION, SOLUTION INTRAMUSCULAR; INTRAVENOUS; SUBCUTANEOUS at 11:26

## 2021-07-19 RX ADMIN — OXYCODONE AND ACETAMINOPHEN 1 TABLET: 10; 325 TABLET ORAL at 17:35

## 2021-07-19 RX ADMIN — HYDROMORPHONE HYDROCHLORIDE 1 MG: 1 INJECTION, SOLUTION INTRAMUSCULAR; INTRAVENOUS; SUBCUTANEOUS at 13:48

## 2021-07-19 RX ADMIN — Medication 10 ML: at 22:28

## 2021-07-19 RX ADMIN — PIPERACILLIN AND TAZOBACTAM 3.38 G: 3; .375 INJECTION, POWDER, LYOPHILIZED, FOR SOLUTION INTRAVENOUS at 17:00

## 2021-07-19 RX ADMIN — PIPERACILLIN AND TAZOBACTAM 3.38 G: 3; .375 INJECTION, POWDER, LYOPHILIZED, FOR SOLUTION INTRAVENOUS at 08:48

## 2021-07-19 RX ADMIN — HYDROMORPHONE HYDROCHLORIDE 1 MG: 1 INJECTION, SOLUTION INTRAMUSCULAR; INTRAVENOUS; SUBCUTANEOUS at 22:50

## 2021-07-19 RX ADMIN — HYDROMORPHONE HYDROCHLORIDE 1 MG: 1 INJECTION, SOLUTION INTRAMUSCULAR; INTRAVENOUS; SUBCUTANEOUS at 08:48

## 2021-07-19 RX ADMIN — PIPERACILLIN AND TAZOBACTAM 3.38 G: 3; .375 INJECTION, POWDER, LYOPHILIZED, FOR SOLUTION INTRAVENOUS at 22:27

## 2021-07-19 RX ADMIN — POTASSIUM CHLORIDE AND SODIUM CHLORIDE: 900; 150 INJECTION, SOLUTION INTRAVENOUS at 17:00

## 2021-07-19 RX ADMIN — Medication 10 ML: at 13:49

## 2021-07-19 RX ADMIN — OXYCODONE AND ACETAMINOPHEN 1 TABLET: 10; 325 TABLET ORAL at 23:58

## 2021-07-19 RX ADMIN — GUANFACINE HYDROCHLORIDE 2 MG: 1 TABLET ORAL at 22:27

## 2021-07-19 RX ADMIN — SODIUM CHLORIDE 100 ML: 900 INJECTION, SOLUTION INTRAVENOUS at 01:25

## 2021-07-19 RX ADMIN — POTASSIUM CHLORIDE AND SODIUM CHLORIDE: 900; 150 INJECTION, SOLUTION INTRAVENOUS at 13:48

## 2021-07-19 RX ADMIN — GADOBUTROL 9 ML: 604.72 INJECTION INTRAVENOUS at 01:24

## 2021-07-19 RX ADMIN — OXYCODONE AND ACETAMINOPHEN 1 TABLET: 10; 325 TABLET ORAL at 09:38

## 2021-07-19 RX ADMIN — POLYETHYLENE GLYCOL 3350 17 G: 17 POWDER, FOR SOLUTION ORAL at 22:27

## 2021-07-19 RX ADMIN — HYDROMORPHONE HYDROCHLORIDE 1 MG: 1 INJECTION, SOLUTION INTRAMUSCULAR; INTRAVENOUS; SUBCUTANEOUS at 17:00

## 2021-07-19 RX ADMIN — MIRTAZAPINE 30 MG: 15 TABLET, FILM COATED ORAL at 22:26

## 2021-07-19 RX ADMIN — MORPHINE SULFATE 4 MG: 4 INJECTION, SOLUTION INTRAMUSCULAR; INTRAVENOUS at 07:01

## 2021-07-19 NOTE — CONSULTS
Surgery Consult    Subjective:      Carmen Barnes is a 31 y.o. male who presents complaining of several day history of abdominal pain and back pain mainly on the right side. He also states that he has pain in the mid torso. At the time that I saw him the patient had a significant amount of pain medication and was arousable but not fully awake. He denies any nausea or vomiting. He states that he is able to eat whatever he wants without any difficulties. He has never had anything like this before. He is usually able to eat whatever he wants without any difficulties. Patient Active Problem List    Diagnosis Date Noted    Choledocholithiasis 07/18/2021    Drug overdose 10/05/2016    Altered mental status 10/05/2016    Acute renal failure (ARF) ([facility] [address] 10/05/2016    Tachycardia 10/05/2016     Past Medical History:   Diagnosis Date    Alcohol abuse     Anxiety and depression     Migraine     Seizures ([facility] [address]       Past Surgical History:   Procedure Laterality Date    HX ORTHOPAEDIC  2010    hip    HX ORTHOPAEDIC  2019    wrist      Social History     Tobacco Use    Smoking status: Current Some Day Smoker    Smokeless tobacco: Never Used   Substance Use Topics    Alcohol use: Yes      No family history on file. Current Facility-Administered Medications   Medication Dose Route Frequency    sodium chloride (NS) flush 5-40 mL  5-40 mL IntraVENous Q8H    sodium chloride (NS) flush 5-40 mL  5-40 mL IntraVENous PRN    piperacillin-tazobactam (ZOSYN) 3.375 g in 0.9% sodium chloride (MBP/ADV) 100 mL MBP  3.375 g IntraVENous Q8H    morphine injection 4 mg  4 mg IntraVENous Q4H PRN    0.9% sodium chloride with KCl 20 mEq/L infusion   IntraVENous CONTINUOUS     Current Outpatient Medications   Medication Sig    erenumab-aooe (Aimovig Autoinjector) 140 mg/mL injection 1 mL by SubCUTAneous route every thirty (30) days.  ubrogepant Remus Niece) 100 mg tablet 1 at HA onset and repeat in 2 hours if needed. Max 2 in 24 hours               [id number] approval number.  SUMAtriptan (IMITREX) 100 mg tablet 1 at HA onset and repeat in 2 hours if needed. Max 2 in 24 hours    Desvenlafaxine 100 mg Tb24     desvenlafaxine succinate (PRISTIQ) 50 mg ER tablet     methylPREDNISolone (MEDROL DOSEPACK) 4 mg tablet Take as directed    DULoxetine (CYMBALTA) 30 mg capsule Take 1 Cap by mouth daily.  Lisdexamfetamine (VYVANSE) 70 mg capsule Take 70 mg by mouth every morning. No Known Allergies    Review of Systems:    Review of systems not obtained due to patient factors. Objective:        Visit Vitals  /77   Pulse 81   Temp 99.6 °F (37.6 °C)   Resp 16   Ht 5' 11\" (1.803 m)   Wt 190 lb (86.2 kg)   SpO2 99%   BMI 26.50 kg/m²       Physical Exam:  GENERAL: alert, cooperative, no distress, slowed mentation, EYE: negative, THROAT & NECK: normal, LUNG: clear to auscultation bilaterally, HEART: regular rate and rhythm, ABDOMEN: Soft minimal tenderness no rebound or guarding, EXTREMITIES:  no edema, SKIN: Normal., NEUROLOGIC: negative, PSYCH: non focal    Imaging:  images and reports reviewed  CT- Distended gallbladder. Questionable extrahepatic biliary dilatation. Consider ultrasound for further assessment     2. Moderate stool burden    US  Distended gallbladder with some tenderness over the gallbladder although no  definite gallbladder wall thickening is present. No definite stones are  identified. . Common bile duct is also mildly dilated. Findings may represent  choledocholithiasis. Acute cholecystitis is thought less likely although not  entirely excluded. -   Lab/Data Review: All lab results for the last 24 hours reviewed.     Recent Results (from the past 24 hour(s))   METABOLIC PANEL, COMPREHENSIVE    Collection Time: 07/18/21  6:45 PM   Result Value Ref Range    Sodium 138 136 - 145 mmol/L    Potassium 3.6 3.5 - 5.1 mmol/L    Chloride 106 97 - 108 mmol/L    CO2 25 21 - 32 mmol/L    Anion gap 7 5 - 15 mmol/L    Glucose 126 (H) 65 - 100 mg/dL    BUN 17 6 - 20 MG/DL    Creatinine 1.02 0.70 - 1.30 MG/DL    BUN/Creatinine ratio 17 12 - 20      GFR est AA >60 >60 ml/min/1.73m2    GFR est non-AA >60 >60 ml/min/1.73m2    Calcium 9.7 8.5 - 10.1 MG/DL    Bilirubin, total 1.0 0.2 - 1.0 MG/DL    ALT (SGPT) 99 (H) 12 - 78 U/L    AST (SGOT) 136 (H) 15 - 37 U/L    Alk. phosphatase 146 (H) 45 - 117 U/L    Protein, total 8.4 (H) 6.4 - 8.2 g/dL    Albumin 4.4 3.5 - 5.0 g/dL    Globulin 4.0 2.0 - 4.0 g/dL    A-G Ratio 1.1 1.1 - 2.2     CBC WITH AUTOMATED DIFF    Collection Time: 07/18/21  6:45 PM   Result Value Ref Range    WBC 12.7 (H) 4.1 - 11.1 K/uL    RBC 5.18 4.10 - 5.70 M/uL    HGB 15.2 12.1 - 17.0 g/dL    HCT 42.8 36.6 - 50.3 %    MCV 82.6 80.0 - 99.0 FL    MCH 29.3 26.0 - 34.0 PG    MCHC 35.5 30.0 - 36.5 g/dL    RDW 14.0 11.5 - 14.5 %    PLATELET 934 817 - 293 K/uL    MPV 9.9 8.9 - 12.9 FL    NRBC 0.0 0  WBC    ABSOLUTE NRBC 0.00 0.00 - 0.01 K/uL    NEUTROPHILS 73 32 - 75 %    LYMPHOCYTES 17 12 - 49 %    MONOCYTES 6 5 - 13 %    EOSINOPHILS 2 0 - 7 %    BASOPHILS 1 0 - 1 %    IMMATURE GRANULOCYTES 1 (H) 0.0 - 0.5 %    ABS. NEUTROPHILS 9.6 (H) 1.8 - 8.0 K/UL    ABS. LYMPHOCYTES 2.1 0.8 - 3.5 K/UL    ABS. MONOCYTES 0.7 0.0 - 1.0 K/UL    ABS. EOSINOPHILS 0.2 0.0 - 0.4 K/UL    ABS. BASOPHILS 0.1 0.0 - 0.1 K/UL    ABS. IMM. GRANS. 0.1 (H) 0.00 - 0.04 K/UL    DF AUTOMATED     ETHYL ALCOHOL    Collection Time: 07/18/21  8:02 PM   Result Value Ref Range    ALCOHOL(ETHYL),SERUM <10 <10 MG/DL   SAMPLES BEING HELD    Collection Time: 07/18/21 11:07 PM   Result Value Ref Range    SAMPLES BEING HELD 2silver bc bottles     COMMENT        Add-on orders for these samples will be processed based on acceptable specimen integrity and analyte stability, which may vary by analyte. Assessment:plan   82-year-old male with dilated common bile duct and dilated gallbladder.   While no stones are seen on ultrasound this certainly could be a gallstone related process causing CBD obstruction with backup to the gallbladder itself. Will need MRCP to evaluate. If there are stones causing obstruction would need GI for ERCP. Otherwise may need laparoscopic cholecystectomy. Keep n.p.o. for now until we have this sorted out.

## 2021-07-19 NOTE — PROGRESS NOTES
Surgery Progress Note    7/19/2021    Admit Date: 7/18/2021    CC: Back pain      Subjective:     Back pain, cant pee. Constitutional: No fever or chills  Neurologic: No headache  Eyes: No scleral icterus or irritated eyes  Nose: No nasal pain or drainage  Mouth: No oral lesions or sore throat  Cardiac: No palpations or chest pain  Pulmonary: No cough or shortness or breath  Gastrointestinal: No nausea, emesis, diarrhea, or constipation  Genitourinary: No dysuria  Musculoskeletal: No muscle or joint tenderness  Skin: No rashes or lesions  Psychiatric: No anxiety or depressed mood    Objective:     Visit Vitals  /63   Pulse 65   Temp 99.6 °F (37.6 °C)   Resp 16   Ht 5' 11\" (1.803 m)   Wt 190 lb (86.2 kg)   SpO2 98%   BMI 26.50 kg/m²       General: No acute distress, conversant  Eyes: PERRLA, no scleral icterus  HENT: Normocephalic without oral lesions  Neck: Trachea midline without LAD  Cardiac: Normal pulse rate and rhythm  Pulmonary: Symmetric chest rise with normal effort  GI: Soft, back and RUQ pain, no splenomegaly  Skin: Warm without rash  Extremities: No edema or joint stiffness  Psych: Appropriate mood and affect    Labs, vital signs, and I/O reviewed. Assessment:     32 y/o M with concern for CBD stone and concern for gallstone pancreatitis    Plan:     PRN pain control  MRCP negative for stone.  GI consult  Gallstone pancreatitis, IVF, NPO  Abx for possible acute paul  Lovenox  Plan for lap paul with IOC tomorrow if pain improved    Maria Zavlaa MD  Bariatric and General Surgeon  [facility]

## 2021-07-19 NOTE — H&P
Hospitalist Admission Note    NAME: Keiko Westfall   :  1990   MRN:  [id number]     Date/Time:  2021 12:39 AM    Patient PCP: Ileana Shannon  ______________________________________________________________________  Given the patient's current clinical presentation, I have a high level of concern for decompensation if discharged from the emergency department. Complex decision making was performed, which includes reviewing the patient's available past medical records, laboratory results, and x-ray films. My assessment of this patient's clinical condition and my plan of care is as follows. Assessment / Plan:  Patient 53-year-old male admitted for abdominal pain  1. Abdominal pain; gallstone disease, acute cholecystitis, choledocholithiasis, suspected; patient admitted for further evaluation and treatment, empiric antibiotic, keep patient n.p.o.,, IV fluid, pain control, GI consult, MRCP  2. Mood disorder; resume home medication        Code Status: Code  Surrogate Decision Maker:    DVT Prophylaxis: SCDs  GI Prophylaxis: not indicated    Baseline: Independent      Subjective:   CHIEF COMPLAINT: Abdominal pain    HISTORY OF PRESENT ILLNESS:     Keiko Westfall is a 31 y.o.  male who presents with abdominal pain. Patient reports pain started 2 days ago, was severe today so decided to come for treatment. Pain located in the middle of the abdomen radiates to back. Patient reports nausea but no vomiting. No BM. Decreased urine output as well. Pain is new,no hx of abd pain ,  denies any dysuria or frequency, no history of kidney stone or gallbladder disease. Pain has significantly improved since given morphine in the emergency room. Upon arrival patient had CT scan of abdomen  showed distended gallbladder, questionable extrahepatic biliary dilatation, ultrasound of abdomen shows distended gallbladder with some tenderness over the gallbladder.   No definitive stone identified. Common bile duct mildly dilated. Routine lab work shows  slightly elevated alkaline phosphatase and ALT, AST. Hospitalist consulted for admission. General surgery also consulted. MRI of abdomen pending. We were asked to admit for work up and evaluation of the above problems. Past Medical History:   Diagnosis Date    Alcohol abuse     Anxiety and depression     Migraine     Seizures ([address]         Past Surgical History:   Procedure Laterality Date    HX ORTHOPAEDIC  2010    hip    HX ORTHOPAEDIC  2019    wrist       Social History     Tobacco Use    Smoking status: Current Some Day Smoker    Smokeless tobacco: Never Used   Substance Use Topics    Alcohol use: Yes        No family history on file. No Known Allergies     Prior to Admission medications    Medication Sig Start Date End Date Taking? Authorizing Provider   erenumab-aooe (Aimovig Autoinjector) 140 mg/mL injection 1 mL by SubCUTAneous route every thirty (30) days. 6/8/21   Neftali Zayas NP   ubrogepant Lisabeth Kindler) 100 mg tablet 1 at HA onset and repeat in 2 hours if needed. Max 2 in 24 hours               [id number] approval number. 6/1/21   Neftali Zayas NP   SUMAtriptan (IMITREX) 100 mg tablet 1 at HA onset and repeat in 2 hours if needed. Max 2 in 24 hours 6/1/21   Bud Andersen NP   Desvenlafaxine 100 mg Tb24  8/17/20   Provider, Historical   desvenlafaxine succinate (PRISTIQ) 50 mg ER tablet  6/17/20   Provider, Historical   methylPREDNISolone (MEDROL DOSEPACK) 4 mg tablet Take as directed 8/28/20   Neftali Zayas NP   DULoxetine (CYMBALTA) 30 mg capsule Take 1 Cap by mouth daily. 2/6/20   Cecil Torres MD   Lisdexamfetamine (VYVANSE) 70 mg capsule Take 70 mg by mouth every morning. Provider, Historical       REVIEW OF SYSTEMS:     I am not able to complete the review of systems because:    The patient is intubated and sedated    The patient has altered mental status due to his acute medical problems    The patient has baseline aphasia from prior stroke(s)    The patient has baseline dementia and is not reliable historian    The patient is in acute medical distress and unable to provide information           Total of 12 systems reviewed as follows:       POSITIVE= underlined text  Negative = text not underlined  General:  fever, chills, sweats, generalized weakness, weight loss/gain,      loss of appetite   Eyes:    blurred vision, eye pain, loss of vision, double vision  ENT:    rhinorrhea, pharyngitis   Respiratory:   cough, sputum production, SOB, BRUNER, wheezing, pleuritic pain   Cardiology:   chest pain, palpitations, orthopnea, PND, edema, syncope   Gastrointestinal:  Positive for abdominal pain  Genitourinary:  frequency, urgency, dysuria, hematuria, incontinence   Muskuloskeletal :  arthralgia, myalgia, back pain  Hematology:  easy bruising, nose or gum bleeding, lymphadenopathy   Dermatological: rash, ulceration, pruritis, color change / jaundice  Endocrine:   hot flashes or polydipsia   Neurological:  headache, dizziness, confusion, focal weakness, paresthesia,     Speech difficulties, memory loss, gait difficulty  Psychological: Feelings of anxiety, depression, agitation    Objective:   VITALS:    Visit Vitals  /77   Pulse 81   Temp 99.6 °F (37.6 °C)   Resp 16   Ht 5' 11\" (1.803 m)   Wt 86.2 kg (190 lb)   SpO2 99%   BMI 26.50 kg/m²       PHYSICAL EXAM:    General:    Alert, cooperative, no distress, appears stated age. HEENT: Atraumatic, anicteric sclerae, pink conjunctivae     No oral ulcers, mucosa moist, throat clear, dentition fair  Neck:  Supple, symmetrical,  thyroid: non tender  Lungs:   Clear to auscultation bilaterally. No Wheezing or Rhonchi. No rales. Chest wall:  No tenderness  No Accessory muscle use. Heart:   Regular  rhythm,  No  murmur   No edema  Abdomen:   Abdomen is not distended, slightly tender right upper quadrant. No rebound  Extremities: No cyanosis.   No clubbing,      Skin turgor normal, Capillary refill normal, Radial dial pulse 2+  Skin:     Not pale. Not Jaundiced  No rashes   Psych:  Good insight. Not depressed. Not anxious or agitated. Neurologic: EOMs intact. No facial asymmetry. No aphasia or slurred speech. Symmetrical strength, Sensation grossly intact. Alert and oriented X 4.     _______________________________________________________________________  Care Plan discussed with:    Comments   Patient     Family      RN     Care Manager                    Consultant:      _______________________________________________________________________  Expected  Disposition:   Home with Family    HH/PT/OT/RN    SNF/LTC    ANGE    ________________________________________________________________________  TOTAL TIME:  39 Minutes    Critical Care Provided     Minutes non procedure based      Comments     Reviewed previous records   >50% of visit spent in counseling and coordination of care  Discussion with patient and/or family and questions answered       ________________________________________________________________________  Signed: Monalisa Juares MD    Procedures: see electronic medical records for all procedures/Xrays and details which were not copied into this note but were reviewed prior to creation of Plan.     LAB DATA REVIEWED:    Recent Results (from the past 24 hour(s))   METABOLIC PANEL, COMPREHENSIVE    Collection Time: 07/18/21  6:45 PM   Result Value Ref Range    Sodium 138 136 - 145 mmol/L    Potassium 3.6 3.5 - 5.1 mmol/L    Chloride 106 97 - 108 mmol/L    CO2 25 21 - 32 mmol/L    Anion gap 7 5 - 15 mmol/L    Glucose 126 (H) 65 - 100 mg/dL    BUN 17 6 - 20 MG/DL    Creatinine 1.02 0.70 - 1.30 MG/DL    BUN/Creatinine ratio 17 12 - 20      GFR est AA >60 >60 ml/min/1.73m2    GFR est non-AA >60 >60 ml/min/1.73m2    Calcium 9.7 8.5 - 10.1 MG/DL    Bilirubin, total 1.0 0.2 - 1.0 MG/DL    ALT (SGPT) 99 (H) 12 - 78 U/L    AST (SGOT) 136 (H) 15 - 37 U/L Alk. phosphatase 146 (H) 45 - 117 U/L    Protein, total 8.4 (H) 6.4 - 8.2 g/dL    Albumin 4.4 3.5 - 5.0 g/dL    Globulin 4.0 2.0 - 4.0 g/dL    A-G Ratio 1.1 1.1 - 2.2     CBC WITH AUTOMATED DIFF    Collection Time: 07/18/21  6:45 PM   Result Value Ref Range    WBC 12.7 (H) 4.1 - 11.1 K/uL    RBC 5.18 4.10 - 5.70 M/uL    HGB 15.2 12.1 - 17.0 g/dL    HCT 42.8 36.6 - 50.3 %    MCV 82.6 80.0 - 99.0 FL    MCH 29.3 26.0 - 34.0 PG    MCHC 35.5 30.0 - 36.5 g/dL    RDW 14.0 11.5 - 14.5 %    PLATELET 212 066 - 814 K/uL    MPV 9.9 8.9 - 12.9 FL    NRBC 0.0 0  WBC    ABSOLUTE NRBC 0.00 0.00 - 0.01 K/uL    NEUTROPHILS 73 32 - 75 %    LYMPHOCYTES 17 12 - 49 %    MONOCYTES 6 5 - 13 %    EOSINOPHILS 2 0 - 7 %    BASOPHILS 1 0 - 1 %    IMMATURE GRANULOCYTES 1 (H) 0.0 - 0.5 %    ABS. NEUTROPHILS 9.6 (H) 1.8 - 8.0 K/UL    ABS. LYMPHOCYTES 2.1 0.8 - 3.5 K/UL    ABS. MONOCYTES 0.7 0.0 - 1.0 K/UL    ABS. EOSINOPHILS 0.2 0.0 - 0.4 K/UL    ABS. BASOPHILS 0.1 0.0 - 0.1 K/UL    ABS. IMM. GRANS. 0.1 (H) 0.00 - 0.04 K/UL    DF AUTOMATED     ETHYL ALCOHOL    Collection Time: 07/18/21  8:02 PM   Result Value Ref Range    ALCOHOL(ETHYL),SERUM <10 <10 MG/DL   SAMPLES BEING HELD    Collection Time: 07/18/21 11:07 PM   Result Value Ref Range    SAMPLES BEING HELD 2silver bc bottles     COMMENT        Add-on orders for these samples will be processed based on acceptable specimen integrity and analyte stability, which may vary by analyte.

## 2021-07-19 NOTE — PROGRESS NOTES
TRANSFER - IN REPORT:    Verbal report received from Lyubov(name) on Faith Hassan  being received from ED(unit) for routine progression of care      Report consisted of patients Situation, Background, Assessment and   Recommendations(SBAR). Information from the following report(s) SBAR, Kardex and ED Summary was reviewed with the receiving nurse. Opportunity for questions and clarification was provided. Assessment completed upon patients arrival to unit and care assumed.

## 2021-07-19 NOTE — PROGRESS NOTES
Transition of Care:  Anticipate discharge home w/ girlfriend. Cm will continue to follow patients clinical progress and discharge needs. Cm met w/ patient and girlfriend, cm introduced self, explained role and offered support. Patient lives with girlfriend and was independent w/adls and iadls prior to admission. Patient expressed no additional concerns related to discharge planning at this time.     Reason for Admission:  Abdominal Pain                     RUR Score:   7%                 Plan for utilizing home health:          PCP: First and Last name:  Melvi Irene   Are you a current patient: Yes/No: Yes   Approximate date of last visit: last week   Can you participate in a virtual visit with your PCP: Yes                  Current Advanced Directive/Advance Care Plan: Full Code      Healthcare Decision Maker:   Click here to complete [address] including selection of the Healthcare Decision Maker Relationship (ie \"Primary\")

## 2021-07-19 NOTE — ROUTINE PROCESS
TRANSFER - OUT REPORT:    Verbal report given to [employer] (name) on Tomy Olivares  being transferred to  (unit) for routine progression of care       Report consisted of patients Situation, Background, Assessment and   Recommendations(SBAR). Information from the following report(s) SBAR, Kardex, ED Summary, Intake/Output, MAR and Recent Results was reviewed with the receiving nurse. Lines:   Peripheral IV 07/18/21 Left Antecubital (Active)   Site Assessment Clean, dry, & intact 07/18/21 1847   Phlebitis Assessment 0 07/18/21 1847   Infiltration Assessment 0 07/18/21 1847   Dressing Status Clean, dry, & intact 07/18/21 1847        Opportunity for questions and clarification was provided.       Patient transported with:   [employer]

## 2021-07-19 NOTE — CONSULTS
[address]  [address] [facility], [place] [address] [place]  [place], [address]  [phone]                     GI CONSULTATION NOTE      NAME:  Carmen Barnes   :   1990   MRN:   [id number]     Consult Date: 2021     Chief Complaint: Dilated CBD, possible choledocholithiasis    History of Present Illness:  Patient is a 31 y.o. who is seen in consultation at the request of Dr. Jerel Fox  for the above-mentioned problem/s. PMH includes EtOH abuse, no EtOH x4 years  Mr. Cornelia Flores is a very pleasant 61-year-old WM who presented to the emergency room  for several hours of new onset right upper quadrant and left upper back pain. He reports his back pain has been present earlier in the week worse when the abdominal pain started yesterday. There were no fever or chills. There is no nausea or vomiting; he denies current or history of heartburn, epigastric pain, early satiety. At baseline his bowel habits are irregular however have been frequent over the last week than his usual.  No blood in stool or black stool. He has not noticed jaundice, neither did his girlfriend who is currently in the room. There is no changes in color or consistency of his urine or stool but he does report decreased urine output over the last 24 to 48 hours. He has never had abdominal surgery. He does not recall ever having a lipid panel checked. Denies dietary intolerances or allergies. Does not use NSAIDs no recent abx. Marijuana use multiple times per week for sleep; last 3 days ago. Today I am seeing him shortly after being in the bathroom. He is complaining of right upper quadrant and right upper back pain, only minimally improved with current medication (Dilaudid). There is no nausea or vomiting.   He is currently n.p.o.    PMH:  Past Medical History:   Diagnosis Date    Alcohol abuse     Anxiety and depression     Migraine     Seizures (HCC)        PSH:  Past Surgical History:   Procedure Laterality Date    HX ORTHOPAEDIC  2010    hip    HX ORTHOPAEDIC  2019    wrist       Allergies:  No Known Allergies    Home Medications:  Prior to Admission Medications   Prescriptions Last Dose Informant Patient Reported? Taking? DULoxetine (CYMBALTA) 30 mg capsule   No No   Sig: Take 1 Cap by mouth daily. Desvenlafaxine 100 mg Tb24   Yes No   Lisdexamfetamine (VYVANSE) 70 mg capsule   Yes No   Sig: Take 70 mg by mouth every morning. SUMAtriptan (IMITREX) 100 mg tablet   No No   Si at HA onset and repeat in 2 hours if needed. Max 2 in 24 hours   desvenlafaxine succinate (PRISTIQ) 50 mg ER tablet   Yes No   erenumab-aooe (Aimovig Autoinjector) 140 mg/mL injection   No No   Si mL by SubCUTAneous route every thirty (30) days. methylPREDNISolone (MEDROL DOSEPACK) 4 mg tablet   No No   Sig: Take as directed   ubrogepant Eileen Rausch) 100 mg tablet   No No   Si at HA onset and repeat in 2 hours if needed. Max 2 in 24 hours               [id number] approval number. Facility-Administered Medications: None       Hospital Medications:  Current Facility-Administered Medications   Medication Dose Route Frequency    HYDROmorphone (PF) (DILAUDID) injection 1 mg  1 mg IntraVENous Q2H PRN    oxyCODONE-acetaminophen (PERCOCET) 5-325 mg per tablet 1 Tablet  1 Tablet Oral Q4H PRN    oxyCODONE-acetaminophen (PERCOCET 10)  mg per tablet 1 Tablet  1 Tablet Oral Q4H PRN    ondansetron (ZOFRAN) injection 4 mg  4 mg IntraVENous Q4H PRN    sodium chloride (NS) flush 5-40 mL  5-40 mL IntraVENous Q8H    piperacillin-tazobactam (ZOSYN) 3.375 g in 0.9% sodium chloride (MBP/ADV) 100 mL MBP  3.375 g IntraVENous Q8H    0.9% sodium chloride with KCl 20 mEq/L infusion   IntraVENous CONTINUOUS       Social History:  Social History     Tobacco Use    Smoking status: Current Some Day Smoker    Smokeless tobacco: Never Used   Substance Use Topics    Alcohol use: Yes       Family History:  No family history on file.     Review of Systems:    Constitutional: negative fever, negative chills, negative weight loss  Eyes:   negative visual changes  ENT:   negative sore throat, tongue or lip swelling  Respiratory:  negative cough, negative dyspnea  Cards:  negative for chest pain, palpitations, lower extremity edema  GI:   See HPI  :  negative for frequency, dysuria  Integument:  negative for rash and pruritus  Heme:  negative for easy bruising and gum/nose bleeding  Musculoskel: negative for myalgias,  back pain and muscle weakness  Neuro: negative for headaches, dizziness, vertigo  Psych:  negative for feelings of anxiety, depression      Objective:     Patient Vitals for the past 8 hrs:   BP Pulse Resp SpO2   07/19/21 0704 124/63 65 16 98 %   07/19/21 0045 102/61 -- 18 94 %     No intake/output data recorded. No intake/output data recorded. PHYSICAL EXAM:  General appearance: cooperative, no distress  Skin: No jaundice, pallor, rashes   HEENT: Sclerae anicteric. .   Cardiovascular: Regular rate and rhythm. Respiratory: Comfortable breathing. Clear breath sounds with no wheezes, rales, or rhonchi. GI: RUQ, epigastrium TTP, no rebound or guarding. BS +, no masses  Rectal: Deferred   Musculoskeletal: No pitting edema of the lower legs. No costovertebral tenderness. Neurological: Gross memory  intact. Patient is alert and oriented. Psychiatric: Moodappropriate with good judgement. No anxiety or agitation. Data Review     Recent Labs     07/18/21  1845   WBC 12.7*   HGB 15.2   HCT 42.8        Recent Labs     07/18/21  1845      K 3.6      CO2 25   BUN 17   CREA 1.02   *   CA 9.7     Recent Labs     07/18/21  1845   *   TP 8.4*   ALB 4.4   GLOB 4.0     No results for input(s): INR, PTP, APTT, INREXT in the last 72 hours. CT A/P wo 7/18/21: Distended gallbladder. Questionable extrahepatic biliary dilatation. Consider ultrasound for further assessment. Moderate stool burden  US Abd 7/18/21: 1. Distended gallbladder with some tenderness over the gallbladder although no definite gallbladder wall thickening is present. No definite stones are identified. . Common bile duct is also mildly dilated. Findings may represent choledocholithiasis. Acute cholecystitis is thought less likely although not entirely excluded. MRCP 7/19/21: 1. As noted previously, gallbladder is distended however there is no gallbladder wall thickening or pericholecystic fluid. No evidence of acute cholecystitis. Common bile duct is borderline in size now measuring 6 mm. There are no filling defects. Heterogeneous enhancement of liver on arterial phase imaging. This can sometimes be associated with acute inflammation. Assessment / Plan :     Abdominal pain  Dilated CBD  RUQ and epigastric pain without N/V. Imagine revealing gallbladder distention without wall thickening, no definitive stones; no filling defect on MRCP although CBD mildly dilated. Possible passed CBD stone vs early cholecystitis. GivenTbili normal and no stones noted on MRCP there is no obvious indication for ERCP at this time. Dr. Tamia Panchal to review imaging and will d/w surgery  ALT 99    - Keep NPO  - Supportive care per hospitalist: IVF (NS + KCL 20mE @75/hr), abx, pain meds antiemetics   - Start PPI    Addendum 12:15 PM: Spoke with Dr. Tamia Panchal as well as Dr. Phong Pedraza from surgery. Plan for HIDA scan in  AM    Constipation  Hx irregular BM worse over the last week. No BM in several days. Moderate burden on CT  - Dulcolax suppository      Patient Active Hospital Problem List:   Active Problems:    Choledocholithiasis (7/18/2021)    I have examined the patient. I have reviewed the chart and agree with the documentation recorded by the NP, including the assessment, treatment plan, and disposition. ASSESSMENT AND PLAN:  31 yr old male who presented with back pain, RUQ pain, constipation  and RLQ pain and elevated LFTs for several days.  US, CT and MRI do not reveal gall stones or CBD stones. He has mild tenderness in RUQ. Potential etiologies include acalculous cholecystitis, false negative MRCP and pain related with constipation. He has history of alcohol use in remote past.  HIDA scan  Hepatitis panel  Further recommendations to follow after HIDA sca. Discussed with Dr Carolina Olivo. Start Miralax  Thank you for consultation.     Jessy Adame MD

## 2021-07-19 NOTE — PROGRESS NOTES
Called by RN to report the patient is again unable to void with greater than 500 cc in bladder. Of note, patient has had no further erection since bladder was decompressed earlier in the day. - Place Kelley catheter.  - Start tamsulosin 0.4 mg po daily.    - Request neurology consultation.

## 2021-07-19 NOTE — PROGRESS NOTES
Hospitalist Progress Note    NAME: Abhilash Schmid   :  1990   MRN:  [id number]       Assessment / Plan:  Abdominal pain  Dilated CBD  Leucocytosis   CT abdomen/pelvis 21:  1. Distended gallbladder. Questionable extrahepatic biliary dilatation. Consider ultrasound for further assessment  2. Moderate stool burden. MRCP 21:  1. As noted previously, gallbladder is distended however there is no gallbladder wall thickening or pericholecystic fluid. No evidence of acute cholecystitis. 2.  Common bile duct is borderline in size now measuring 6 mm. There are no filling defects. 3.  Heterogeneous enhancement of liver on arterial phase imaging. This can sometimes be associated with acute inflammation.  - Surgery input appreciated. - GI input appreciated. - Keep npo. - Continue pantoprazole 40 mg IV daily. Urinary retention  Intermittent involuntary erection  - Patient reports that he has not voided since yesterday in the a.m.  - Bladder scan with >500 cc in bladder.  - Straight cath. - If patient unable to void later today after straight cath will place Kelley and consult urology. - If patient continues to have erections after bladder decompressed will start phenylephrine and consult urology. Mild hyperglycemia   - No tx indicated at this time. - Monitor with a.m. labs. Anxiety  Depression  ADHD  - Continue desvenlafaxine 50 mg po daily. - Continue mirtazapine 30 mg po daily. - Continue guanfacine 2 mg po daily. - Continue gabapentin 800 mg po tid prn anxiety. 25.0 - 29.9 Overweight / Body mass index is 26.5 kg/m². Code status: Full  Prophylaxis: SCDs  Recommended Disposition: Home w/Family     Subjective:     Chief Complaint / Reason for Physician Visit  Patient complains of RUQ abdominal pain and suprapubic pain 2/2 inability to void. He also reports intermittent involuntary erection. Plan of care and pertinent events reviewed with bedside nurse. Review of Systems:  Symptom Y/N Comments  Symptom Y/N Comments   Fever/Chills Y Low grade  Chest Pain N    Poor Appetite  NPO  Edema N    Cough N   Abdominal Pain Y    Sputum N   Joint Pain N    SOB/BRUNER N   Pruritis/Rash N    Nausea/vomit N   Tolerating PT/OT     Diarrhea N   Tolerating Diet  NPO   Constipation Y Last small BM 07/17  Other       Could NOT obtain due to:      Objective:     VITALS:   Last 24hrs VS reviewed since prior progress note. Most recent are:  Patient Vitals for the past 24 hrs:   Temp Pulse Resp BP SpO2   07/19/21 0858 97.5 °F (36.4 °C) 72 16 109/67 99 %   07/19/21 0704 -- 65 16 124/63 98 %   07/19/21 0045 -- -- 18 102/61 94 %   07/19/21 0000 -- -- -- 115/66 96 %   07/18/21 2315 -- -- -- 102/60 98 %   07/18/21 1900 -- 81 16 108/77 99 %   07/18/21 1848 99.6 °F (37.6 °C) 84 18 107/72 99 %   07/18/21 1845 -- -- -- (!) 191/168 99 %     No intake or output data in the 24 hours ending 07/19/21 1016     I had a face to face encounter and independently examined this patient on 7/19/2021, as outlined below:  PHYSICAL EXAM:  General:  A/A/O X 3. NAD. HEENT:  Normocephalic. Sclera anicteric. EOMI. Mucous membranes moist.    Chest:  Resps even/unlabored with symmetrical CWE. Air entry full. Lungs CTA. No use of accessory muscles. CV:  RRR. Normal S1/S2. No M/C/R appreciated. No JVD. No peripheral edema. GI:  Abdomen soft/ND. TTP over RUQ and suprapubic area. No organomegaly. No hernia. ABT X 4.    :  DTV. Neurologic:  Nonfocal.  CN II-XII grossly intact. Face symmetrical.  Speech normal.     Psych:  Cooperative. No anxiety or agitation. No suicidal/homicidal ideation. Skin:  Warm and color appropriate. No rashes or jaundice. Turgor elastic.      Reviewed most current lab test results and cultures  YES  Reviewed most current radiology test results   YES  Review and summation of old records today    NO  Reviewed patient's current orders and MAR    YES  PMH/SH reviewed - no change compared to H&P  ________________________________________________________________________  Care Plan discussed with:    Comments   Patient [address] [place] [address]     Consultant                        Multidiciplinary team rounds were held today with , nursing, pharmacist and clinical coordinator. Patient's plan of care was discussed; medications were reviewed and discharge planning was addressed. ________________________________________________________________________  Salma Doshi NP     Procedures: see electronic medical records for all procedures/Xrays and details which were not copied into this note but were reviewed prior to creation of Plan. LABS:  I reviewed today's most current labs and imaging studies.   Pertinent labs include:  Recent Labs     07/18/21  1845   WBC 12.7*   HGB 15.2   HCT 42.8        Recent Labs     07/18/21  1845      K 3.6      CO2 25   *   BUN 17   CREA 1.02   CA 9.7   ALB 4.4   TBILI 1.0   ALT 99*       Signed: Salma Doshi NP

## 2021-07-20 ENCOUNTER — APPOINTMENT (OUTPATIENT)
Dept: NUCLEAR MEDICINE | Age: 31
DRG: 284 | End: 2021-07-20
Attending: SPECIALIST
Payer: MEDICAID

## 2021-07-20 LAB
ALBUMIN SERPL-MCNC: 3.7 G/DL (ref 3.5–5)
ALBUMIN/GLOB SERPL: 1.1 {RATIO} (ref 1.1–2.2)
ALP SERPL-CCNC: 135 U/L (ref 45–117)
ALT SERPL-CCNC: 152 U/L (ref 12–78)
ANION GAP SERPL CALC-SCNC: 5 MMOL/L (ref 5–15)
AST SERPL-CCNC: 83 U/L (ref 15–37)
BASOPHILS # BLD: 0.1 K/UL (ref 0–0.1)
BASOPHILS NFR BLD: 1 % (ref 0–1)
BILIRUB SERPL-MCNC: 0.3 MG/DL (ref 0.2–1)
BUN SERPL-MCNC: 15 MG/DL (ref 6–20)
BUN/CREAT SERPL: 17 (ref 12–20)
CALCIUM SERPL-MCNC: 8.5 MG/DL (ref 8.5–10.1)
CHLORIDE SERPL-SCNC: 105 MMOL/L (ref 97–108)
CO2 SERPL-SCNC: 27 MMOL/L (ref 21–32)
CREAT SERPL-MCNC: 0.89 MG/DL (ref 0.7–1.3)
CRP SERPL-MCNC: 1.52 MG/DL (ref 0–0.6)
DIFFERENTIAL METHOD BLD: ABNORMAL
EOSINOPHIL # BLD: 0.3 K/UL (ref 0–0.4)
EOSINOPHIL NFR BLD: 5 % (ref 0–7)
ERYTHROCYTE [DISTWIDTH] IN BLOOD BY AUTOMATED COUNT: 14.4 % (ref 11.5–14.5)
GLOBULIN SER CALC-MCNC: 3.3 G/DL (ref 2–4)
GLUCOSE SERPL-MCNC: 90 MG/DL (ref 65–100)
HAV IGM SER QL: NONREACTIVE
HBV CORE IGM SER QL: NONREACTIVE
HBV SURFACE AG SER QL: <0.1 INDEX
HBV SURFACE AG SER QL: NEGATIVE
HCT VFR BLD AUTO: 35.2 % (ref 36.6–50.3)
HCV AB SERPL QL IA: NONREACTIVE
HCV COMMENT,HCGAC: NORMAL
HGB BLD-MCNC: 12.1 G/DL (ref 12.1–17)
IMM GRANULOCYTES # BLD AUTO: 0 K/UL (ref 0–0.04)
IMM GRANULOCYTES NFR BLD AUTO: 0 % (ref 0–0.5)
LIPASE SERPL-CCNC: 38 U/L (ref 73–393)
LYMPHOCYTES # BLD: 2.7 K/UL (ref 0.8–3.5)
LYMPHOCYTES NFR BLD: 38 % (ref 12–49)
MCH RBC QN AUTO: 29.4 PG (ref 26–34)
MCHC RBC AUTO-ENTMCNC: 34.4 G/DL (ref 30–36.5)
MCV RBC AUTO: 85.6 FL (ref 80–99)
MONOCYTES # BLD: 0.5 K/UL (ref 0–1)
MONOCYTES NFR BLD: 6 % (ref 5–13)
NEUTS SEG # BLD: 3.5 K/UL (ref 1.8–8)
NEUTS SEG NFR BLD: 50 % (ref 32–75)
NRBC # BLD: 0 K/UL (ref 0–0.01)
NRBC BLD-RTO: 0 PER 100 WBC
PLATELET # BLD AUTO: 221 K/UL (ref 150–400)
PMV BLD AUTO: 10.7 FL (ref 8.9–12.9)
POTASSIUM SERPL-SCNC: 3.9 MMOL/L (ref 3.5–5.1)
PROT SERPL-MCNC: 7 G/DL (ref 6.4–8.2)
RBC # BLD AUTO: 4.11 M/UL (ref 4.1–5.7)
SODIUM SERPL-SCNC: 137 MMOL/L (ref 136–145)
SP1: NORMAL
SP2: NORMAL
SP3: NORMAL
WBC # BLD AUTO: 7.1 K/UL (ref 4.1–11.1)

## 2021-07-20 PROCEDURE — A9537 TC99M MEBROFENIN: HCPCS

## 2021-07-20 PROCEDURE — 74011250636 HC RX REV CODE- 250/636: Performed by: SURGERY

## 2021-07-20 PROCEDURE — 74011250637 HC RX REV CODE- 250/637: Performed by: SPECIALIST

## 2021-07-20 PROCEDURE — 74011250637 HC RX REV CODE- 250/637: Performed by: NURSE PRACTITIONER

## 2021-07-20 PROCEDURE — 86140 C-REACTIVE PROTEIN: CPT

## 2021-07-20 PROCEDURE — 74011000250 HC RX REV CODE- 250: Performed by: NURSE PRACTITIONER

## 2021-07-20 PROCEDURE — 74011250637 HC RX REV CODE- 250/637: Performed by: SURGERY

## 2021-07-20 PROCEDURE — 74011250636 HC RX REV CODE- 250/636: Performed by: NURSE PRACTITIONER

## 2021-07-20 PROCEDURE — 99232 SBSQ HOSP IP/OBS MODERATE 35: CPT | Performed by: SURGERY

## 2021-07-20 PROCEDURE — C9113 INJ PANTOPRAZOLE SODIUM, VIA: HCPCS | Performed by: NURSE PRACTITIONER

## 2021-07-20 PROCEDURE — 85025 COMPLETE CBC W/AUTO DIFF WBC: CPT

## 2021-07-20 PROCEDURE — 83690 ASSAY OF LIPASE: CPT

## 2021-07-20 PROCEDURE — 80053 COMPREHEN METABOLIC PANEL: CPT

## 2021-07-20 PROCEDURE — 36415 COLL VENOUS BLD VENIPUNCTURE: CPT

## 2021-07-20 PROCEDURE — 74011000258 HC RX REV CODE- 258: Performed by: FAMILY MEDICINE

## 2021-07-20 PROCEDURE — 74011250636 HC RX REV CODE- 250/636: Performed by: FAMILY MEDICINE

## 2021-07-20 PROCEDURE — 65660000000 HC RM CCU STEPDOWN

## 2021-07-20 RX ORDER — AMOXICILLIN 250 MG
1 CAPSULE ORAL 2 TIMES DAILY
Status: DISCONTINUED | OUTPATIENT
Start: 2021-07-20 | End: 2021-07-21 | Stop reason: HOSPADM

## 2021-07-20 RX ORDER — KIT FOR THE PREPARATION OF TECHNETIUM TC 99M MEBROFENIN 45 MG/10ML
5 INJECTION, POWDER, LYOPHILIZED, FOR SOLUTION INTRAVENOUS
Status: COMPLETED | OUTPATIENT
Start: 2021-07-20 | End: 2021-07-20

## 2021-07-20 RX ORDER — KETOROLAC TROMETHAMINE 30 MG/ML
15 INJECTION, SOLUTION INTRAMUSCULAR; INTRAVENOUS EVERY 6 HOURS
Status: DISCONTINUED | OUTPATIENT
Start: 2021-07-20 | End: 2021-07-21 | Stop reason: HOSPADM

## 2021-07-20 RX ADMIN — OXYCODONE AND ACETAMINOPHEN 1 TABLET: 10; 325 TABLET ORAL at 22:30

## 2021-07-20 RX ADMIN — HYDROMORPHONE HYDROCHLORIDE 1 MG: 1 INJECTION, SOLUTION INTRAMUSCULAR; INTRAVENOUS; SUBCUTANEOUS at 12:17

## 2021-07-20 RX ADMIN — PIPERACILLIN AND TAZOBACTAM 3.38 G: 3; .375 INJECTION, POWDER, LYOPHILIZED, FOR SOLUTION INTRAVENOUS at 07:19

## 2021-07-20 RX ADMIN — PIPERACILLIN AND TAZOBACTAM 3.38 G: 3; .375 INJECTION, POWDER, LYOPHILIZED, FOR SOLUTION INTRAVENOUS at 17:43

## 2021-07-20 RX ADMIN — OXYCODONE AND ACETAMINOPHEN 1 TABLET: 10; 325 TABLET ORAL at 13:47

## 2021-07-20 RX ADMIN — KETOROLAC TROMETHAMINE 15 MG: 30 INJECTION, SOLUTION INTRAMUSCULAR; INTRAVENOUS at 09:15

## 2021-07-20 RX ADMIN — HYDROMORPHONE HYDROCHLORIDE 1 MG: 1 INJECTION, SOLUTION INTRAMUSCULAR; INTRAVENOUS; SUBCUTANEOUS at 15:05

## 2021-07-20 RX ADMIN — KETOROLAC TROMETHAMINE 15 MG: 30 INJECTION, SOLUTION INTRAMUSCULAR; INTRAVENOUS at 13:15

## 2021-07-20 RX ADMIN — HYDROMORPHONE HYDROCHLORIDE 1 MG: 1 INJECTION, SOLUTION INTRAMUSCULAR; INTRAVENOUS; SUBCUTANEOUS at 05:44

## 2021-07-20 RX ADMIN — Medication 10 ML: at 05:46

## 2021-07-20 RX ADMIN — HYDROMORPHONE HYDROCHLORIDE 1 MG: 1 INJECTION, SOLUTION INTRAMUSCULAR; INTRAVENOUS; SUBCUTANEOUS at 17:43

## 2021-07-20 RX ADMIN — POLYETHYLENE GLYCOL 3350 17 G: 17 POWDER, FOR SOLUTION ORAL at 17:43

## 2021-07-20 RX ADMIN — ENOXAPARIN SODIUM 40 MG: 40 INJECTION SUBCUTANEOUS at 13:15

## 2021-07-20 RX ADMIN — Medication 10 ML: at 21:51

## 2021-07-20 RX ADMIN — POTASSIUM CHLORIDE AND SODIUM CHLORIDE: 900; 150 INJECTION, SOLUTION INTRAVENOUS at 05:46

## 2021-07-20 RX ADMIN — TAMSULOSIN HYDROCHLORIDE 0.4 MG: 0.4 CAPSULE ORAL at 09:15

## 2021-07-20 RX ADMIN — GUANFACINE HYDROCHLORIDE 2 MG: 1 TABLET ORAL at 21:50

## 2021-07-20 RX ADMIN — MIRTAZAPINE 30 MG: 15 TABLET, FILM COATED ORAL at 21:50

## 2021-07-20 RX ADMIN — KIT FOR THE PREPARATION OF TECHNETIUM TC 99M MEBROFENIN 5 MILLICURIE: 45 INJECTION, POWDER, LYOPHILIZED, FOR SOLUTION INTRAVENOUS at 10:20

## 2021-07-20 RX ADMIN — OXYCODONE AND ACETAMINOPHEN 1 TABLET: 10; 325 TABLET ORAL at 17:58

## 2021-07-20 RX ADMIN — SODIUM CHLORIDE 40 MG: 9 INJECTION INTRAMUSCULAR; INTRAVENOUS; SUBCUTANEOUS at 09:15

## 2021-07-20 RX ADMIN — DOCUSATE SODIUM 50 MG AND SENNOSIDES 8.6 MG 1 TABLET: 8.6; 5 TABLET, FILM COATED ORAL at 18:05

## 2021-07-20 RX ADMIN — HYDROMORPHONE HYDROCHLORIDE 1 MG: 1 INJECTION, SOLUTION INTRAMUSCULAR; INTRAVENOUS; SUBCUTANEOUS at 21:50

## 2021-07-20 RX ADMIN — POLYETHYLENE GLYCOL 3350 17 G: 17 POWDER, FOR SOLUTION ORAL at 09:15

## 2021-07-20 RX ADMIN — POTASSIUM CHLORIDE AND SODIUM CHLORIDE: 900; 150 INJECTION, SOLUTION INTRAVENOUS at 13:51

## 2021-07-20 RX ADMIN — HYDROMORPHONE HYDROCHLORIDE 1 MG: 1 INJECTION, SOLUTION INTRAMUSCULAR; INTRAVENOUS; SUBCUTANEOUS at 02:06

## 2021-07-20 RX ADMIN — KETOROLAC TROMETHAMINE 15 MG: 30 INJECTION, SOLUTION INTRAMUSCULAR; INTRAVENOUS at 17:43

## 2021-07-20 RX ADMIN — HYDROMORPHONE HYDROCHLORIDE 1 MG: 1 INJECTION, SOLUTION INTRAMUSCULAR; INTRAVENOUS; SUBCUTANEOUS at 19:46

## 2021-07-20 NOTE — PROGRESS NOTES
Bedside and Verbal shift change report given to Rivera Chamberlain RN (oncoming nurse) by Juan Pablo Vasquez RN (offgoing nurse). Report included the following information SBAR, Kardex, Intake/Output, MAR and Recent Results.

## 2021-07-20 NOTE — PROGRESS NOTES
[address]  [address] [place] [address] [address], [address]  [phone]                     GI PROGRESS NOTE    Patient Name: Johnny Barron      : 1990      MRN: [id number]  Admit Date: 2021  Today's Date: 2021    Subjective:     Continue to have RUQ an epigastric pain - no meds pending HIDA. No N/V. Kelley placed last night 2/2 low UOP - adequate output. Objective:     Blood pressure (!) 95/54, pulse 68, temperature 97.7 °F (36.5 °C), resp. rate 16, height 5' 11\" (1.803 m), weight 86.2 kg (190 lb), SpO2 97 %. Physical Exam:  General appearance: cooperative, appear mild to moderately uncomfortable, no acute distress  Skin: No jaundice, pallor, rashes   HEENT: Sclerae anicteric. .   Cardiovascular: Regular rate and rhythm. Respiratory: Comfortable breathing. Clear breath sounds with no wheezes, rales, or rhonchi. GI: RUQ, epigastrium TTP, no rebound or guarding. BS +, no masses  Rectal: Deferred   Musculoskeletal: No pitting edema of the lower legs. No costovertebral tenderness. Neurological: Gross memory  intact. Patient is alert and oriented. Psychiatric: Moodappropriate with good judgement. No anxiety or agitation. Data Review:    Recent Results (from the past 24 hour(s))   COVID-19 RAPID TEST    Collection Time: 21  4:25 PM   Result Value Ref Range    Specimen source Nasopharyngeal      COVID-19 rapid test Not detected NOTD     HEPATITIS PANEL, ACUTE    Collection Time: 21 10:58 PM   Result Value Ref Range    Hepatitis A, IgM NONREACTIVE NR      __          Hepatitis B surface Ag <0.10 Index    Hep B surface Ag Interp. Negative NEG      __          Hepatitis B core, IgM NONREACTIVE NR      __          Hep C virus Ab Interp.  NONREACTIVE NR      Hep C  virus Ab comment Method used is Siemens Advia Centaur     LIPASE    Collection Time: 21 12:23 AM   Result Value Ref Range    Lipase 38 (L) 73 - 393 U/L   C REACTIVE PROTEIN, QT Collection Time: 07/20/21 12:23 AM   Result Value Ref Range    C-Reactive protein 1.52 (H) 0.00 - 0.60 mg/dL   CBC WITH AUTOMATED DIFF    Collection Time: 07/20/21 12:23 AM   Result Value Ref Range    WBC 7.1 4.1 - 11.1 K/uL    RBC 4.11 4.10 - 5.70 M/uL    HGB 12.1 12.1 - 17.0 g/dL    HCT 35.2 (L) 36.6 - 50.3 %    MCV 85.6 80.0 - 99.0 FL    MCH 29.4 26.0 - 34.0 PG    MCHC 34.4 30.0 - 36.5 g/dL    RDW 14.4 11.5 - 14.5 %    PLATELET 074 861 - 817 K/uL    MPV 10.7 8.9 - 12.9 FL    NRBC 0.0 0  WBC    ABSOLUTE NRBC 0.00 0.00 - 0.01 K/uL    NEUTROPHILS 50 32 - 75 %    LYMPHOCYTES 38 12 - 49 %    MONOCYTES 6 5 - 13 %    EOSINOPHILS 5 0 - 7 %    BASOPHILS 1 0 - 1 %    IMMATURE GRANULOCYTES 0 0.0 - 0.5 %    ABS. NEUTROPHILS 3.5 1.8 - 8.0 K/UL    ABS. LYMPHOCYTES 2.7 0.8 - 3.5 K/UL    ABS. MONOCYTES 0.5 0.0 - 1.0 K/UL    ABS. EOSINOPHILS 0.3 0.0 - 0.4 K/UL    ABS. BASOPHILS 0.1 0.0 - 0.1 K/UL    ABS. IMM. GRANS. 0.0 0.00 - 0.04 K/UL    DF AUTOMATED     METABOLIC PANEL, COMPREHENSIVE    Collection Time: 07/20/21 12:23 AM   Result Value Ref Range    Sodium 137 136 - 145 mmol/L    Potassium 3.9 3.5 - 5.1 mmol/L    Chloride 105 97 - 108 mmol/L    CO2 27 21 - 32 mmol/L    Anion gap 5 5 - 15 mmol/L    Glucose 90 65 - 100 mg/dL    BUN 15 6 - 20 MG/DL    Creatinine 0.89 0.70 - 1.30 MG/DL    BUN/Creatinine ratio 17 12 - 20      GFR est AA >60 >60 ml/min/1.73m2    GFR est non-AA >60 >60 ml/min/1.73m2    Calcium 8.5 8.5 - 10.1 MG/DL    Bilirubin, total 0.3 0.2 - 1.0 MG/DL    ALT (SGPT) 152 (H) 12 - 78 U/L    AST (SGOT) 83 (H) 15 - 37 U/L    Alk. phosphatase 135 (H) 45 - 117 U/L    Protein, total 7.0 6.4 - 8.2 g/dL    Albumin 3.7 3.5 - 5.0 g/dL    Globulin 3.3 2.0 - 4.0 g/dL    A-G Ratio 1.1 1.1 - 2.2           Assessment / Plan :     Abdominal pain  Dilated CBD  RUQ and epigastric pain without N/V.  Imagine revealing gallbladder distention without wall thickening, no definitive stones; no filling defect on MRCP although CBD mildly dilated. Possible passed CBD stone vs early cholecystitis vs pancreatitis. GivenTbili normal and no stones noted on MRCP, no indication for ERCP at this time. Also no clear indication for surgical intervention   (99 yesterday) AST 83 (136)  (146); Lipase 38 (588); WBC 7.1 (12.7) Hepatitis panel WNL  - HIDA scan today  - NPO  - Supportive care per hospitalist: IVF (NS + KCL 20mE @75/hr), abx, pain meds antiemetics   - PPI daily     Constipation  Hx irregular BM worse over the last week. No BM in several days. Moderate burden on CT. Will add meds once able to take po  - Dulcolax suppository      Patient Active Hospital Problem List:   Principal Problem:    Acute pancreatitis (7/19/2021)    Active Problems:    Transaminitis (7/19/2021)               I have examined the patient. I have reviewed the chart and agree with the documentation recorded by the NP, including the assessment, treatment plan, and disposition. ASSESSMENT AND PLAN:  HIDA scan normal, he could passed a CBD stone but no evidence of stone on MRCP/CT. He feels better. Advance diet as tolerated. Miralax BID for constipation. Urinary retention could be secondary to narcotics.     Kim Powell MD

## 2021-07-20 NOTE — PROGRESS NOTES
Surgery Progress Note    7/20/2021    Admit Date: 7/18/2021    CC: Back pain      Subjective: Kelley placed. Review of images show no discrete stone. Pain improved some. Constitutional: No fever or chills  Neurologic: No headache  Eyes: No scleral icterus or irritated eyes  Nose: No nasal pain or drainage  Mouth: No oral lesions or sore throat  Cardiac: No palpations or chest pain  Pulmonary: No cough or shortness or breath  Gastrointestinal: Abd pain, No nausea, emesis, diarrhea, or constipation  Genitourinary: Dysuria  Musculoskeletal: No muscle or joint tenderness  Skin: No rashes or lesions  Psychiatric: No anxiety or depressed mood    Objective:     Visit Vitals  BP (!) 89/54 (BP 1 Location: Right upper arm, BP Patient Position: At rest)   Pulse (!) 58   Temp 97.5 °F (36.4 °C)   Resp 16   Ht 5' 11\" (1.803 m)   Wt 190 lb (86.2 kg)   SpO2 98%   BMI 26.50 kg/m²       General: No acute distress, conversant  Eyes: PERRLA, no scleral icterus  HENT: Normocephalic without oral lesions  Neck: Trachea midline without LAD  Cardiac: Normal pulse rate and rhythm  Pulmonary: Symmetric chest rise with normal effort  GI: Soft, back and RUQ pain, no splenomegaly  Skin: Warm without rash  Extremities: No edema or joint stiffness  Psych: Appropriate mood and affect    Kelley in place    Labs, vital signs, and I/O reviewed. Assessment:     32 y/o M with transaminitis and pancreatitis without gallstones identified. Plan:     PRN pain control. Toradol   MRCP negative. US negative for stone but with biliary distension. No GB wall thickening. Hep panel negative thus far.  HIDA today  IVF  NPO  Abx for possible acute paul  Lovenox  Holding on OR plan for time being pending definitive etiology    Bruno Greco MD  Bariatric and General Surgeon  [facility]

## 2021-07-20 NOTE — PROGRESS NOTES
MARGARITA:  1. RUR-6%  2. Return home with girlfriend when stable. CM noted of Gen. Surgery note, they will start patient on clear liquid diet, no  Surgical intervention at this time per MD note.       Donnell Osorio [facility]

## 2021-07-20 NOTE — PROGRESS NOTES
Problem: Falls - Risk of  Goal: *Absence of Falls  Description: Document Menakonrad Blevins Fall Risk and appropriate interventions in the flowsheet.   Outcome: Progressing Towards Goal  Note: Fall Risk Interventions:            Medication Interventions: Teach patient to arise slowly

## 2021-07-20 NOTE — PROGRESS NOTES
HIDA negative. No stones. No surgical intervention. Okay for clears.     Tulio Lee MD  Bariatric and General Surgeon  [facility]

## 2021-07-20 NOTE — CONSULTS
Urology Consult    Subjective:     Date of Consultation:  July 20, 2021    Referring Physician: Eduard Solis    Reason for Consultation:  urinary retention    History of Present Illness:   Patient is a 31 y.o. M h/o seizure, migraine who was admitted 7/18 after presenting with right upper quadrant and left upper back pain x1 week. CT revealed gallbladder distention without wall thickening, no filling defect on MRCP. GI consult-favors passed CBD stone versus early cholecystitis. He has been evaluated by general surgery-HIDA scan pending, considering cholecystectomy. Urology was consulted for elevated postvoid residuals with bladder scan greater than 500 cc. Required in and out catheterization for 600 cc. Subsequently unable to void and Kelley catheter was placed yesterday. On CT, his urinary tract appeared normal.  Creatinine 0.89. No urinalysis. He denies prior episodes of similar. No prior  or abdominal surgery. No hematuria, UTI, dysuria, or stones. He has not had a bowel movement in 1.5 weeks. Past Medical History:   Diagnosis Date    Alcohol abuse     Anxiety and depression     Migraine     Seizures ([facility] [address]       Past Surgical History:   Procedure Laterality Date    HX ORTHOPAEDIC  2010    hip    HX ORTHOPAEDIC  2019    wrist      No family history on file. Social History     Tobacco Use    Smoking status: Current Some Day Smoker    Smokeless tobacco: Never Used   Substance Use Topics    Alcohol use: Yes     No Known Allergies   Prior to Admission medications    Medication Sig Start Date End Date Taking? Authorizing Provider   erenumab-aooe (Aimovig Autoinjector) 140 mg/mL injection 1 mL by SubCUTAneous route every thirty (30) days. 6/8/21   O'Laughlin, Estill Landau, NP   ubrogepant Juan Leak) 100 mg tablet 1 at HA onset and repeat in 2 hours if needed. Max 2 in 24 hours               [id number] approval number.  6/1/21   O'Laughlin, Estill Landau, NP   SUMAtriptan (IMITREX) 100 mg tablet 1 at HA onset and repeat in 2 hours if needed. Max 2 in 24 hours 21   Gustavo Hernandez NP   Desvenlafaxine 100 mg Tb24  20   Provider, Historical   methylPREDNISolone (MEDROL DOSEPACK) 4 mg tablet Take as directed 20   Lai Zayas NP   DULoxetine (CYMBALTA) 30 mg capsule Take 1 Cap by mouth daily. 20   Cecil Torres MD   Lisdexamfetamine (VYVANSE) 70 mg capsule Take 70 mg by mouth every morning. Provider, Historical         Review of Systems:  A comprehensive review of systems was negative except for that written in the HPI. Objective:     Patient Vitals for the past 8 hrs:   BP Temp Pulse Resp SpO2   21 0840 (!) 95/54 97.7 °F (36.5 °C) 68 16 97 %   21 0545 (!) 89/54 97.5 °F (36.4 °C) (!) 58 16 98 %     Temp (24hrs), Av.8 °F (36.6 °C), Min:97.4 °F (36.3 °C), Max:98.4 °F (36.9 °C)      Intake and Output:    1901 -  0700  In: -   Out: 2100 [Urine:2100]    Physical Exam:    Gen: NAD  CV: extremities well perfused  Lungs: nonlabored respirations. Symmetric chest expansion  Ext: no edema  Abdomen: soft, no mass, diffusely tender to deep palpation, R>L no rebound or guarding  : davis draining clear yellow urine    Assessment:     Principal Problem:    Acute pancreatitis (2021)    Active Problems:    Transaminitis (2021)    urinary retention  Constipation    Discussed that retention is likely transient and secondary to severe abdominal pain, severe constipation.      Plan:     - continue davis catheter 3-5 days  - aggressive bowel regimen to relieve constipation  - voiding trial when clinically improved, either inpatient or in office

## 2021-07-20 NOTE — PROGRESS NOTES
Hospitalist Progress Note    NAME: Alejandro Lopez   :  1990   MRN:  [id number]       Assessment / Plan:  Abdominal pain  Dilated CBD  Abnormal LFTs    Leucocytosis   Constipation   CT abdomen/pelvis 21:  1. Distended gallbladder. Questionable extrahepatic biliary dilatation. Consider ultrasound for further assessment  2. Moderate stool burden. MRCP 21:  1. As noted previously, gallbladder is distended however there is no gallbladder wall thickening or pericholecystic fluid. No evidence of acute cholecystitis. 2.  Common bile duct is borderline in size now measuring 6 mm. There are no filling defects. 3.  Heterogeneous enhancement of liver on arterial phase imaging. This can sometimes be associated with acute inflammation. HIDA scan 21:  Normal HIDA scan. - Surgery input appreciated. - GI input appreciated. - Patient's pain is in epigastric and RUQ. - Continue ketorolac 15 mg IV q6h x 8 doses. - Continue hydromorphone 1 mg IV q2h prn severe pain (MME = 240). - Continue oxycodone/apap 10/325 mg po q4h prn moderate to severe pain (MME = 90). - Continue oxycodone/apap 5/325 mg po q4h prn moderate pain (MME = 45). - Patient now taking and tolerating clear liquids. - Continue pantoprazole 40 mg IV daily. - Continue polyethylene glycol 17 gm po daily.  - Add senna-docusate 8.6-50 mg po bid. - Continue bisacodyl supp 10 mg pr daily prn. Urinary retention  Intermittent involuntary erection, resolved  - Urology input appreciated. - Kelley placed on 21, plan to maintain for 3-5 days with voiding trial.    - Continue tamsulosin 0.4 mg po daily. Mild hyperglycemia, improved   - No tx indicated at this time. - Monitor with a.m. labs. Anxiety  Depression  ADHD  - Continue desvenlafaxine 100 mg po daily. - Continue guanfacine 2 mg po daily at hs.  - Continue mirtazapine 30 mg po daily. - Continue gabapentin 800 mg po tid prn anxiety.         25.0 - 29.9 Overweight / Body mass index is 26.5 kg/m². Code status: Full  Prophylaxis: SCDs  Recommended Disposition: Home w/Family     Subjective:     Chief Complaint / Reason for Physician Visit  Patient needs to complain of epigastric and RUQ pain . Plan of care and pertinent events reviewed with bedside nurse. Review of Systems:  Symptom Y/N Comments  Symptom Y/N Comments   Fever/Chills N   Chest Pain N    Poor Appetite N   Edema N    Cough N   Abdominal Pain Y    Sputum N   Joint Pain N    SOB/BRUNER N   Pruritis/Rash N    Nausea/vomit N   Tolerating PT/OT     Diarrhea N   Tolerating Diet Y Clears   Constipation Y Last small BM 07/17  Other       Could NOT obtain due to:      Objective:     VITALS:   Last 24hrs VS reviewed since prior progress note. Most recent are:  Patient Vitals for the past 24 hrs:   Temp Pulse Resp BP SpO2   07/20/21 1337 98.2 °F (36.8 °C) 60 16 98/62 94 %   07/20/21 0840 97.7 °F (36.5 °C) 68 16 (!) 95/54 97 %   07/20/21 0545 97.5 °F (36.4 °C) (!) 58 16 (!) 89/54 98 %   07/20/21 0007 97.8 °F (36.6 °C) 61 16 (!) 92/54 100 %   07/19/21 2250 98.4 °F (36.9 °C) (!) 53 16 98/62 98 %   07/19/21 1652 97.4 °F (36.3 °C) (!) 51 16 116/71 100 %       Intake/Output Summary (Last 24 hours) at 7/20/2021 1414  Last data filed at 7/20/2021 0206  Gross per 24 hour   Intake --   Output 1500 ml   Net -1500 ml        I had a face to face encounter and independently examined this patient on 7/20/2021, as outlined below:  PHYSICAL EXAM:  General:  A/A/O X 3. NAD. HEENT:  Normocephalic. Sclera anicteric. EOMI. Mucous membranes moist.    Chest:  Resps even/unlabored with symmetrical CWE. Air entry full. Lungs CTA. No use of accessory muscles. CV:  RRR. Normal S1/S2. No M/C/R appreciated. No JVD. No peripheral edema. GI:  Abdomen soft/ND. TTP over epigastric area and RUQ and suprapubic area. No organomegaly. No hernia. ABT X 4.    :  Kelley with clear diamante UOP.    Neurologic: Nonfocal.  CN II-XII grossly intact. Face symmetrical.  Speech normal.     Psych:  Cooperative. Anxious and intermittently agitated. No suicidal/homicidal ideation. Skin:  Warm and color appropriate. No rashes or jaundice. Turgor elastic. Reviewed most current lab test results and cultures  YES  Reviewed most current radiology test results   YES  Review and summation of old records today    NO  Reviewed patient's current orders and MAR    YES  PMH/SH reviewed - no change compared to H&P  ________________________________________________________________________  Care Plan discussed with:    Comments   Patient [address]     Consultant                        Multidiciplinary team rounds were held today with , nursing, pharmacist and clinical coordinator. Patient's plan of care was discussed; medications were reviewed and discharge planning was addressed. ________________________________________________________________________  Lalita Horne NP     Procedures: see electronic medical records for all procedures/Xrays and details which were not copied into this note but were reviewed prior to creation of Plan. LABS:  I reviewed today's most current labs and imaging studies.   Pertinent labs include:  Recent Labs     07/20/21  0023 07/18/21  1845   WBC 7.1 12.7*   HGB 12.1 15.2   HCT 35.2* 42.8    296     Recent Labs     07/20/21  0023 07/18/21  1845    138   K 3.9 3.6    106   CO2 27 25   GLU 90 126*   BUN 15 17   CREA 0.89 1.02   CA 8.5 9.7   ALB 3.7 4.4   TBILI 0.3 1.0   * 99*       Signed: Lalita Horne NP

## 2021-07-21 VITALS
DIASTOLIC BLOOD PRESSURE: 61 MMHG | BODY MASS INDEX: 26.6 KG/M2 | SYSTOLIC BLOOD PRESSURE: 98 MMHG | TEMPERATURE: 97.7 F | HEIGHT: 71 IN | RESPIRATION RATE: 16 BRPM | OXYGEN SATURATION: 98 % | HEART RATE: 53 BPM | WEIGHT: 190 LBS

## 2021-07-21 PROBLEM — R10.9 ABDOMINAL PAIN: Status: ACTIVE | Noted: 2021-07-21

## 2021-07-21 LAB
ALBUMIN SERPL-MCNC: 3.4 G/DL (ref 3.5–5)
ALBUMIN/GLOB SERPL: 0.9 {RATIO} (ref 1.1–2.2)
ALP SERPL-CCNC: 112 U/L (ref 45–117)
ALT SERPL-CCNC: 97 U/L (ref 12–78)
ANION GAP SERPL CALC-SCNC: 3 MMOL/L (ref 5–15)
AST SERPL-CCNC: 32 U/L (ref 15–37)
BASOPHILS # BLD: 0.1 K/UL (ref 0–0.1)
BASOPHILS NFR BLD: 1 % (ref 0–1)
BILIRUB SERPL-MCNC: 0.2 MG/DL (ref 0.2–1)
BUN SERPL-MCNC: 7 MG/DL (ref 6–20)
BUN/CREAT SERPL: 8 (ref 12–20)
CALCIUM SERPL-MCNC: 8.4 MG/DL (ref 8.5–10.1)
CHLORIDE SERPL-SCNC: 113 MMOL/L (ref 97–108)
CO2 SERPL-SCNC: 27 MMOL/L (ref 21–32)
CREAT SERPL-MCNC: 0.9 MG/DL (ref 0.7–1.3)
DIFFERENTIAL METHOD BLD: ABNORMAL
EOSINOPHIL # BLD: 0.2 K/UL (ref 0–0.4)
EOSINOPHIL NFR BLD: 4 % (ref 0–7)
ERYTHROCYTE [DISTWIDTH] IN BLOOD BY AUTOMATED COUNT: 14.7 % (ref 11.5–14.5)
GLOBULIN SER CALC-MCNC: 3.6 G/DL (ref 2–4)
GLUCOSE SERPL-MCNC: 80 MG/DL (ref 65–100)
HCT VFR BLD AUTO: 38.8 % (ref 36.6–50.3)
HGB BLD-MCNC: 13 G/DL (ref 12.1–17)
IMM GRANULOCYTES # BLD AUTO: 0 K/UL (ref 0–0.04)
IMM GRANULOCYTES NFR BLD AUTO: 0 % (ref 0–0.5)
LIPASE SERPL-CCNC: 47 U/L (ref 73–393)
LYMPHOCYTES # BLD: 2.9 K/UL (ref 0.8–3.5)
LYMPHOCYTES NFR BLD: 50 % (ref 12–49)
MCH RBC QN AUTO: 29.5 PG (ref 26–34)
MCHC RBC AUTO-ENTMCNC: 33.5 G/DL (ref 30–36.5)
MCV RBC AUTO: 88 FL (ref 80–99)
MONOCYTES # BLD: 0.3 K/UL (ref 0–1)
MONOCYTES NFR BLD: 6 % (ref 5–13)
NEUTS SEG # BLD: 2.2 K/UL (ref 1.8–8)
NEUTS SEG NFR BLD: 39 % (ref 32–75)
NRBC # BLD: 0 K/UL (ref 0–0.01)
NRBC BLD-RTO: 0 PER 100 WBC
PLATELET # BLD AUTO: 224 K/UL (ref 150–400)
PMV BLD AUTO: 10.2 FL (ref 8.9–12.9)
POTASSIUM SERPL-SCNC: 4.5 MMOL/L (ref 3.5–5.1)
PROT SERPL-MCNC: 7 G/DL (ref 6.4–8.2)
RBC # BLD AUTO: 4.41 M/UL (ref 4.1–5.7)
SODIUM SERPL-SCNC: 143 MMOL/L (ref 136–145)
WBC # BLD AUTO: 5.8 K/UL (ref 4.1–11.1)

## 2021-07-21 PROCEDURE — 74011250636 HC RX REV CODE- 250/636: Performed by: SURGERY

## 2021-07-21 PROCEDURE — 83690 ASSAY OF LIPASE: CPT

## 2021-07-21 PROCEDURE — 74011250636 HC RX REV CODE- 250/636: Performed by: FAMILY MEDICINE

## 2021-07-21 PROCEDURE — 74011250637 HC RX REV CODE- 250/637: Performed by: NURSE PRACTITIONER

## 2021-07-21 PROCEDURE — 74011250636 HC RX REV CODE- 250/636: Performed by: NURSE PRACTITIONER

## 2021-07-21 PROCEDURE — 36415 COLL VENOUS BLD VENIPUNCTURE: CPT

## 2021-07-21 PROCEDURE — 80053 COMPREHEN METABOLIC PANEL: CPT

## 2021-07-21 PROCEDURE — 74011250637 HC RX REV CODE- 250/637: Performed by: SURGERY

## 2021-07-21 PROCEDURE — 74011000258 HC RX REV CODE- 258: Performed by: FAMILY MEDICINE

## 2021-07-21 PROCEDURE — 74011250637 HC RX REV CODE- 250/637: Performed by: SPECIALIST

## 2021-07-21 PROCEDURE — C9113 INJ PANTOPRAZOLE SODIUM, VIA: HCPCS | Performed by: NURSE PRACTITIONER

## 2021-07-21 PROCEDURE — 85025 COMPLETE CBC W/AUTO DIFF WBC: CPT

## 2021-07-21 PROCEDURE — 99232 SBSQ HOSP IP/OBS MODERATE 35: CPT | Performed by: SURGERY

## 2021-07-21 RX ORDER — POLYETHYLENE GLYCOL 3350 17 G/17G
17 POWDER, FOR SOLUTION ORAL 2 TIMES DAILY
Qty: 60 PACKET | Refills: 0 | Status: SHIPPED | OUTPATIENT
Start: 2021-07-21

## 2021-07-21 RX ORDER — PANTOPRAZOLE SODIUM 40 MG/1
40 TABLET, DELAYED RELEASE ORAL DAILY
Qty: 30 TABLET | Refills: 0 | Status: SHIPPED | OUTPATIENT
Start: 2021-07-21 | End: 2021-08-11 | Stop reason: ALTCHOICE

## 2021-07-21 RX ORDER — MIRTAZAPINE 30 MG/1
30 TABLET, FILM COATED ORAL
COMMUNITY
End: 2021-12-01 | Stop reason: ALTCHOICE

## 2021-07-21 RX ORDER — AMOXICILLIN 250 MG
1 CAPSULE ORAL 2 TIMES DAILY
Qty: 60 TABLET | Refills: 0 | Status: SHIPPED | OUTPATIENT
Start: 2021-07-21 | End: 2021-08-11 | Stop reason: ALTCHOICE

## 2021-07-21 RX ORDER — TAMSULOSIN HYDROCHLORIDE 0.4 MG/1
0.4 CAPSULE ORAL DAILY
Qty: 30 CAPSULE | Refills: 0 | Status: SHIPPED | OUTPATIENT
Start: 2021-07-22 | End: 2021-08-11 | Stop reason: ALTCHOICE

## 2021-07-21 RX ORDER — GUANFACINE 2 MG/1
2 TABLET, EXTENDED RELEASE ORAL DAILY
COMMUNITY

## 2021-07-21 RX ORDER — DESVENLAFAXINE 100 MG/1
1 TABLET, EXTENDED RELEASE ORAL DAILY
COMMUNITY
End: 2021-07-21

## 2021-07-21 RX ORDER — OXYCODONE HYDROCHLORIDE 5 MG/1
5 TABLET ORAL
Qty: 9 TABLET | Refills: 0 | Status: SHIPPED | OUTPATIENT
Start: 2021-07-21 | End: 2021-07-24

## 2021-07-21 RX ORDER — HYDROMORPHONE HYDROCHLORIDE 1 MG/ML
1 INJECTION, SOLUTION INTRAMUSCULAR; INTRAVENOUS; SUBCUTANEOUS
Status: DISCONTINUED | OUTPATIENT
Start: 2021-07-21 | End: 2021-07-21

## 2021-07-21 RX ORDER — FACIAL-BODY WIPES
10 EACH TOPICAL
Status: COMPLETED | OUTPATIENT
Start: 2021-07-21 | End: 2021-07-21

## 2021-07-21 RX ORDER — NALOXONE HYDROCHLORIDE 4 MG/.1ML
SPRAY NASAL
Qty: 1 EACH | Refills: 0 | Status: SHIPPED | OUTPATIENT
Start: 2021-07-21

## 2021-07-21 RX ADMIN — OXYCODONE AND ACETAMINOPHEN 1 TABLET: 10; 325 TABLET ORAL at 09:51

## 2021-07-21 RX ADMIN — HYDROMORPHONE HYDROCHLORIDE 1 MG: 1 INJECTION, SOLUTION INTRAMUSCULAR; INTRAVENOUS; SUBCUTANEOUS at 05:23

## 2021-07-21 RX ADMIN — HYDROMORPHONE HYDROCHLORIDE 1 MG: 1 INJECTION, SOLUTION INTRAMUSCULAR; INTRAVENOUS; SUBCUTANEOUS at 03:00

## 2021-07-21 RX ADMIN — Medication 10 ML: at 07:33

## 2021-07-21 RX ADMIN — PIPERACILLIN AND TAZOBACTAM 3.38 G: 3; .375 INJECTION, POWDER, LYOPHILIZED, FOR SOLUTION INTRAVENOUS at 00:21

## 2021-07-21 RX ADMIN — OXYCODONE AND ACETAMINOPHEN 1 TABLET: 10; 325 TABLET ORAL at 17:49

## 2021-07-21 RX ADMIN — KETOROLAC TROMETHAMINE 15 MG: 30 INJECTION, SOLUTION INTRAMUSCULAR; INTRAVENOUS at 13:18

## 2021-07-21 RX ADMIN — SODIUM CHLORIDE 40 MG: 9 INJECTION INTRAMUSCULAR; INTRAVENOUS; SUBCUTANEOUS at 09:51

## 2021-07-21 RX ADMIN — DOCUSATE SODIUM 50 MG AND SENNOSIDES 8.6 MG 1 TABLET: 8.6; 5 TABLET, FILM COATED ORAL at 09:51

## 2021-07-21 RX ADMIN — OXYCODONE AND ACETAMINOPHEN 1 TABLET: 10; 325 TABLET ORAL at 05:47

## 2021-07-21 RX ADMIN — PIPERACILLIN AND TAZOBACTAM 3.38 G: 3; .375 INJECTION, POWDER, LYOPHILIZED, FOR SOLUTION INTRAVENOUS at 14:54

## 2021-07-21 RX ADMIN — POLYETHYLENE GLYCOL 3350 17 G: 17 POWDER, FOR SOLUTION ORAL at 09:52

## 2021-07-21 RX ADMIN — HYDROMORPHONE HYDROCHLORIDE 1 MG: 1 INJECTION, SOLUTION INTRAMUSCULAR; INTRAVENOUS; SUBCUTANEOUS at 13:58

## 2021-07-21 RX ADMIN — OXYCODONE AND ACETAMINOPHEN 1 TABLET: 10; 325 TABLET ORAL at 13:58

## 2021-07-21 RX ADMIN — PIPERACILLIN AND TAZOBACTAM 3.38 G: 3; .375 INJECTION, POWDER, LYOPHILIZED, FOR SOLUTION INTRAVENOUS at 07:34

## 2021-07-21 RX ADMIN — KETOROLAC TROMETHAMINE 15 MG: 30 INJECTION, SOLUTION INTRAMUSCULAR; INTRAVENOUS at 07:33

## 2021-07-21 RX ADMIN — HYDROMORPHONE HYDROCHLORIDE 1 MG: 1 INJECTION, SOLUTION INTRAMUSCULAR; INTRAVENOUS; SUBCUTANEOUS at 09:51

## 2021-07-21 RX ADMIN — ENOXAPARIN SODIUM 40 MG: 40 INJECTION SUBCUTANEOUS at 13:19

## 2021-07-21 RX ADMIN — TAMSULOSIN HYDROCHLORIDE 0.4 MG: 0.4 CAPSULE ORAL at 09:51

## 2021-07-21 RX ADMIN — Medication 10 ML: at 13:19

## 2021-07-21 RX ADMIN — KETOROLAC TROMETHAMINE 15 MG: 30 INJECTION, SOLUTION INTRAMUSCULAR; INTRAVENOUS at 00:21

## 2021-07-21 RX ADMIN — BISACODYL 10 MG: 10 SUPPOSITORY RECTAL at 13:18

## 2021-07-21 NOTE — PROGRESS NOTES
[facility].  [address] [address] [address], [address]  [phone]                     GI PROGRESS NOTE    Patient Name: Lou Pedersen      : 1990      MRN: [id number]  Admit Date: 2021  Today's Date: 2021    Subjective:     Abdominal pain much improved. Still c/o back pain (hx back fracture). Tolerating regular diet. No BM since admission. Anxious to go home    Objective:     Blood pressure 94/62, pulse (!) 58, temperature 97.7 °F (36.5 °C), resp. rate 16, height 5' 11\" (1.803 m), weight 86.2 kg (190 lb), SpO2 98 %. Physical Exam:  General appearance: cooperative, appears well NAD  Skin: No jaundice, pallor, rashes   HEENT: Sclerae anicteric. .   Cardiovascular: Regular rate and rhythm. Respiratory: Comfortable breathing. GI: RUQ, epigastrium TTP, no rebound or guarding. BS +, no masses  Neurological: Patient is alert and oriented. Psychiatric: Moodappropriate with good judgement. No anxiety or agitation. Data Review:    Recent Results (from the past 24 hour(s))   CBC WITH AUTOMATED DIFF    Collection Time: 21  5:53 AM   Result Value Ref Range    WBC 5.8 4.1 - 11.1 K/uL    RBC 4.41 4.10 - 5.70 M/uL    HGB 13.0 12.1 - 17.0 g/dL    HCT 38.8 36.6 - 50.3 %    MCV 88.0 80.0 - 99.0 FL    MCH 29.5 26.0 - 34.0 PG    MCHC 33.5 30.0 - 36.5 g/dL    RDW 14.7 (H) 11.5 - 14.5 %    PLATELET 105 959 - 695 K/uL    MPV 10.2 8.9 - 12.9 FL    NRBC 0.0 0  WBC    ABSOLUTE NRBC 0.00 0.00 - 0.01 K/uL    NEUTROPHILS 39 32 - 75 %    LYMPHOCYTES 50 (H) 12 - 49 %    MONOCYTES 6 5 - 13 %    EOSINOPHILS 4 0 - 7 %    BASOPHILS 1 0 - 1 %    IMMATURE GRANULOCYTES 0 0.0 - 0.5 %    ABS. NEUTROPHILS 2.2 1.8 - 8.0 K/UL    ABS. LYMPHOCYTES 2.9 0.8 - 3.5 K/UL    ABS. MONOCYTES 0.3 0.0 - 1.0 K/UL    ABS. EOSINOPHILS 0.2 0.0 - 0.4 K/UL    ABS. BASOPHILS 0.1 0.0 - 0.1 K/UL    ABS. IMM.  GRANS. 0.0 0.00 - 0.04 K/UL    DF AUTOMATED     METABOLIC PANEL, COMPREHENSIVE    Collection Time: 07/21/21  5:53 AM   Result Value Ref Range    Sodium 143 136 - 145 mmol/L    Potassium 4.5 3.5 - 5.1 mmol/L    Chloride 113 (H) 97 - 108 mmol/L    CO2 27 21 - 32 mmol/L    Anion gap 3 (L) 5 - 15 mmol/L    Glucose 80 65 - 100 mg/dL    BUN 7 6 - 20 MG/DL    Creatinine 0.90 0.70 - 1.30 MG/DL    BUN/Creatinine ratio 8 (L) 12 - 20      GFR est AA >60 >60 ml/min/1.73m2    GFR est non-AA >60 >60 ml/min/1.73m2    Calcium 8.4 (L) 8.5 - 10.1 MG/DL    Bilirubin, total 0.2 0.2 - 1.0 MG/DL    ALT (SGPT) 97 (H) 12 - 78 U/L    AST (SGOT) 32 15 - 37 U/L    Alk. phosphatase 112 45 - 117 U/L    Protein, total 7.0 6.4 - 8.2 g/dL    Albumin 3.4 (L) 3.5 - 5.0 g/dL    Globulin 3.6 2.0 - 4.0 g/dL    A-G Ratio 0.9 (L) 1.1 - 2.2           Assessment / Plan :     Abdominal pain  Dilated CBD  RUQ and epigastric pain without N/V. Imagine revealing gallbladder distention without wall thickening, no definitive stones; no filling defect on MRCP although CBD mildly dilated. Possible passed CBD stone vs early cholecystitis vs pancreatitis. GivenTbili normal and no stones noted on MRCP, no indication for ERCP at this time. Also no clear indication for surgical intervention  LFT trending down, Lipase 47 (38); CBC WNL Hepatitis panel WNL  HIDA 7/20/21: Normal   Etiology likely passed stone prior to imaging  - Continue regular diet  - Agree with  Increasing intervals between doses of narcotics pain   --Can be discharged home  from GI perspective when pain adequately controlled off narcotis  --Educated to follow-up with recurrent sx. --Counseled re possible development of cannabis hyperemesis with continued use     Constipation  Hx irregular BM worse over the last week. No BM in several days. Moderate burden on CT.  Will add meds once able to take po  - Continue Miralax  - Dulcolax suppository today  - Should stay on Mirlalax regimen after DC: once daily titrated up to max TID as needed for BM at least every other day  - Can follow-up as outpatient with continued issues     Patient Active Hospital Problem List:   Principal Problem:    Acute pancreatitis (7/19/2021)    Active Problems:    Transaminitis (7/19/2021)    I have examined the patient. I have reviewed the chart and agree with the documentation recorded by the NP, including the assessment, treatment plan, and disposition.       Korin Murray MD

## 2021-07-21 NOTE — PROGRESS NOTES
Bedside shift change report given to [address] [place] [address] (oncoming nurse) by Tigre Loyola (offgoing nurse). Report included the following information SBAR, Kardex, MAR and Recent Results.

## 2021-07-21 NOTE — PROGRESS NOTES
Surgery Progress Note    7/21/2021    Admit Date: 7/18/2021    CC: Back pain      Subjective:     Eating. Pain improved. Constitutional: No fever or chills  Neurologic: No headache  Eyes: No scleral icterus or irritated eyes  Nose: No nasal pain or drainage  Mouth: No oral lesions or sore throat  Cardiac: No palpations or chest pain  Pulmonary: No cough or shortness or breath  Gastrointestinal: Abd pain, No nausea, emesis, diarrhea, or constipation  Genitourinary: Dysuria  Musculoskeletal: No muscle or joint tenderness  Skin: No rashes or lesions  Psychiatric: No anxiety or depressed mood    Objective:     Visit Vitals  /75 (BP 1 Location: Left upper arm, BP Patient Position: At rest)   Pulse 60   Temp 97.5 °F (36.4 °C)   Resp 16   Ht 5' 11\" (1.803 m)   Wt 190 lb (86.2 kg)   SpO2 98%   BMI 26.50 kg/m²       General: No acute distress, conversant  Eyes: PERRLA, no scleral icterus  HENT: Normocephalic without oral lesions  Neck: Trachea midline without LAD  Cardiac: Normal pulse rate and rhythm  Pulmonary: Symmetric chest rise with normal effort  GI: Soft, back pain, non tender no splenomegaly  Skin: Warm without rash  Extremities: No edema or joint stiffness  Psych: Appropriate mood and affect    Kelley in place    Labs, vital signs, and I/O reviewed. Assessment:     30 y/o M with transaminitis and pancreatitis without gallstones identified and negative HIDA scan    Plan:     No obvious biliary etiology for his symptoms and labs. No surgical intervention indicated. Appreciate help by CARLOS Ramos for discharge when cleared by primary. No follow-up needed. Surgery to sign off.     Gabi Gallegos MD  Bariatric and General Surgeon  [address] [place] [address] [facility]

## 2021-07-21 NOTE — DISCHARGE SUMMARY
Hospitalist Discharge Summary     Patient ID:  Liv Tejada  [id number]  89 y.o.  1990 7/18/2021    PCP on record: Jean Dawn date: 7/18/2021  Discharge date and time: 7/21/2021    DISCHARGE DIAGNOSIS:  Abdominal pain  Dilated CBD  Abnormal LFTs    Leucocytosis   Constipation   Urinary retention  Intermittent involuntary erection, resolved  Mild hyperglycemia, improved   Anxiety  Depression  ADHD    CONSULTATIONS:  IP CONSULT TO GENERAL SURGERY  IP CONSULT TO GASTROENTEROLOGY  IP CONSULT TO GASTROENTEROLOGY  IP CONSULT TO UROLOGY    Excerpted HPI from H&P of Demetrice Ibarra MD:  Liz Andrade is a 31 y.o.  male who presents with abdominal pain. Patient reports pain started 2 days ago, was severe today so decided to come for treatment. Pain located in the middle of the abdomen radiates to back. Patient reports nausea but no vomiting. No BM. Decreased urine output as well. Pain is new,no hx of abd pain ,  denies any dysuria or frequency, no history of kidney stone or gallbladder disease. Pain has significantly improved since given morphine in the emergency room. Upon arrival patient had CT scan of abdomen  showed distended gallbladder, questionable extrahepatic biliary dilatation, ultrasound of abdomen shows distended gallbladder with some tenderness over the gallbladder. No definitive stone identified. Common bile duct mildly dilated. Routine lab work shows  slightly elevated alkaline phosphatase and ALT, AST. Hospitalist consulted for admission. General surgery also consulted. MRI of abdomen pending. \"    ______________________________________________________________________  DISCHARGE SUMMARY/HOSPITAL COURSE:  for full details see H&P, daily progress notes, labs, consult notes. Abdominal pain  Dilated CBD  Abnormal LFTs    Leucocytosis   Constipation   CT abdomen/pelvis 07/18/21:  1.  Distended gallbladder. Questionable extrahepatic biliary dilatation. Consider ultrasound for further assessment  2.  Moderate stool burden. MRCP 07/19/21:  1.  As noted previously, gallbladder is distended however there is no gallbladder wall thickening or pericholecystic fluid. No evidence of acute cholecystitis. 2.  Common bile duct is borderline in size now measuring 6 mm. There are no filling defects. 3.  Heterogeneous enhancement of liver on arterial phase imaging. This can sometimes be associated with acute inflammation. HIDA scan 07/20/21:  Normal HIDA scan. - Patient was seen by surgery while hospitalized with no surgical intervention indicated, cleared for discharge. - Patient was seen by GI while hospitalized, cleared for discharge. - Epigastric and RUQ improved however he continued to use opioid pain medication.  - Tolerated GI lite diet prior to hospital discharge. - Patient had 2 small BMs on day of hospital discharge. - D/C on pantoprazole 40 mg po daily.    - D/C on polyethylene glycol 17 gm po daily.  - D/C on senna-docusate 8.6-50 mg po bid. - Patient was instructed that if he is unable to have BM once he goes home to get magnesium citrate and use according to package instructions. - D/C on short course of opioid pain medication.   - Prescribe naloxone nasal spray prn, instructions given.       Urinary retention  Intermittent involuntary erection, resolved  - Patient was seen by urology while hospitalized. - Kelley placed urinary retention however he passed a voiding trial on 07/21/2021 and will be discharged without Kelley catheter. - D/C on tamsulosin 0.4 mg po daily.     Mild hyperglycemia, improved   - Patient not require treatment while hospitalized.     Anxiety  Depression  ADHD  - Continue PTA medications.        All discharge instructions and discharge medications were reviewed with patient who verbalized understanding.    _______________________________________________________________________  Patient seen and examined by me on discharge day.  Pertinent Findings:  General:  A/A/O X 3. NAD. HEENT:  Normocephalic. Sclera anicteric. EOMI. Mucous membranes moist.    Chest:  Resps even/unlabored with symmetrical CWE. Air entry full. Lungs CTA. No use of accessory muscles. CV:  RRR. Normal S1/S2. No M/C/R appreciated. No JVD. No peripheral edema. GI:  Abdomen soft, slightly distended, and mildly TTP over epigastric area and RUQ. No organomegaly. No hernia. ABT X 4.    : Kelley DC'd and patient voided post catheter removal.  Neurologic:  Nonfocal.  CN II-XII grossly intact. Face symmetrical.  Speech normal.     Psych:  Cooperative. No anxiety or agitation. No suicidal/homicidal ideation. Skin:  Warm and color appropriate. No rashes or jaundice. Turgor elastic.   _______________________________________________________________________  DISCHARGE MEDICATIONS:   Current Discharge Medication List      START taking these medications    Details   polyethylene glycol (MIRALAX) 17 gram packet Take 1 Packet by mouth two (2) times a day. Qty: 60 Packet, Refills: 0  Start date: 7/21/2021      senna-docusate (PERICOLACE) 8.6-50 mg per tablet Take 1 Tablet by mouth two (2) times a day. Qty: 60 Tablet, Refills: 0  Start date: 7/21/2021      tamsulosin (FLOMAX) 0.4 mg capsule Take 1 Capsule by mouth daily. Qty: 30 Capsule, Refills: 0  Start date: 7/22/2021      pantoprazole (PROTONIX) 40 mg tablet Take 1 Tablet by mouth daily. Qty: 30 Tablet, Refills: 0  Start date: 7/21/2021      oxyCODONE IR (Roxicodone) 5 mg immediate release tablet Take 1 Tablet by mouth every eight (8) hours as needed for Pain for up to 3 days. Max Daily Amount: 15 mg.  Qty: 9 Tablet, Refills: 0  Start date: 7/21/2021, End date: 7/24/2021    Associated Diagnoses: Right upper quadrant abdominal pain      naloxone (Narcan) 4 mg/actuation nasal spray Use 1 spray intranasally, then discard.  Repeat with new spray every 2 min as needed for opioid overdose symptoms, alternating nostrils. Qty: 1 Each, Refills: 0  Start date: 7/21/2021         CONTINUE these medications which have NOT CHANGED    Details   guanFACINE ER (INTUNIV) 2 mg ER tablet Take 2 mg by mouth daily. mirtazapine (REMERON) 30 mg tablet Take 30 mg by mouth nightly. erenumab-aooe (Aimovig Autoinjector) 140 mg/mL injection 1 mL by SubCUTAneous route every thirty (30) days. Qty: 1 Each, Refills: 5    Associated Diagnoses: Intractable migraine without status migrainosus, unspecified migraine type      SUMAtriptan (IMITREX) 100 mg tablet 1 at HA onset and repeat in 2 hours if needed. Max 2 in 24 hours  Qty: 15 Tablet, Refills: 5    Associated Diagnoses: Other migraine without status migrainosus, not intractable      Desvenlafaxine 100 mg Tb24                Patient Follow Up Instructions: Activity: Activity as tolerated  Diet: Gi  lite  Wound Care: None needed    Follow-up as noted below  Follow-up tests/labs: CMP    Follow-up Information     Follow up With Specialties Details Why Contact Info    [facility] Internal Medicine  Keep the appointment that you have scheduled for Thursday 07/22/21 [address] [facility]      Dora Lozano MD Gastroenterology  This is the gastroenterologist who saw you while you are hospitalized. If you continue to have issues please contact his office for follow-up appointment [address] [place] [address]      Sana Santana MD Urology  This is the urologist who saw you while hospitalized.   If you continue to have issues please contact his office for follow-up appointment Reji  [phone]          ________________________________________________________________    Risk of deterioration: Moderate    Condition at Discharge:  Stable  __________________________________________________________________    Disposition  Home with family, no needs    ____________________________________________________________________    Code Status: Full Code  ___________________________________________________________________      Total time in minutes spent coordinating this discharge (includes going over instructions, follow-up, prescriptions, and preparing report for sign off to her PCP) :  >30 minutes    Signed:  Gera Vidal NP

## 2021-07-21 NOTE — DISCHARGE INSTRUCTIONS
Abdominal Pain: Care Instructions  Your Care Instructions     Abdominal pain has many possible causes. Some aren't serious and get better on their own in a few days. Others need more testing and treatment. If your pain continues or gets worse, you need to be rechecked and may need more tests to find out what is wrong. You may need surgery to correct the problem. Don't ignore new symptoms, such as fever, nausea and vomiting, urination problems, pain that gets worse, and dizziness. These may be signs of a more serious problem. Your doctor may have recommended a follow-up visit in the next 8 to 12 hours. If you are not getting better, you may need more tests or treatment. The doctor has checked you carefully, but problems can develop later. If you notice any problems or new symptoms, get medical treatment right away. Follow-up care is a key part of your treatment and safety. Be sure to make and go to all appointments, and call your doctor if you are having problems. It's also a good idea to know your test results and keep a list of the medicines you take. How can you care for yourself at home? · Rest until you feel better. · To prevent dehydration, drink plenty of fluids. Choose water and other caffeine-free clear liquids until you feel better. If you have kidney, heart, or liver disease and have to limit fluids, talk with your doctor before you increase the amount of fluids you drink. · If your stomach is upset, eat mild foods, such as rice, dry toast or crackers, bananas, and applesauce. Try eating several small meals instead of two or three large ones. · Wait until 48 hours after all symptoms have gone away before you have spicy foods, alcohol, and drinks that contain caffeine. · Do not eat foods that are high in fat. · Avoid anti-inflammatory medicines such as aspirin, ibuprofen (Advil, Motrin), and naproxen (Aleve). These can cause stomach upset.  Talk to your doctor if you take daily aspirin for another health problem. When should you call for help? Call 911 anytime you think you may need emergency care. For example, call if:    · You passed out (lost consciousness).     · You pass maroon or very bloody stools.     · You vomit blood or what looks like coffee grounds.     · You have new, severe belly pain. Call your doctor now or seek immediate medical care if:    · Your pain gets worse, especially if it becomes focused in one area of your belly.     · You have a new or higher fever.     · Your stools are black and look like tar, or they have streaks of blood.     · You have unexpected vaginal bleeding.     · You have symptoms of a urinary tract infection. These may include:  ? Pain when you urinate. ? Urinating more often than usual.  ? Blood in your urine.     · You are dizzy or lightheaded, or you feel like you may faint. Watch closely for changes in your health, and be sure to contact your doctor if:    · You are not getting better after 1 day (24 hours). Where can you learn more? Go to http://www.gray.com/  Enter I115 in the search box to learn more about \"Abdominal Pain: Care Instructions. \"  Current as of: 2020               Content Version: 12.8   [employer]. Care instructions adapted under license by [employer] (which disclaims liability or warranty for this information). If you have questions about a medical condition or this instruction, always ask your healthcare professional. [place] [address] any warranty or liability for your use of this information.           HOSPITALIST DISCHARGE INSTRUCTIONS    NAME: Byronperlita Dickerson   :  1990   MRN:  [id number]     Date/Time:  2021 5:02 PM    ADMIT DATE: 2021   DISCHARGE DATE: 2021     Attending Physician: Akbar Patterson NP    DISCHARGE DIAGNOSIS:  Abdominal pain  Dilated common bile duct  Abnormal liver function tests (improved prior to hospital discharge)  Leucocytosis  (elevated white blood cell count which resolved prior to hospital discharge)  Constipation   Urinary retention (inability to pass your urine which resolved prior to hospital discharge)  Intermittent involuntary erection (likely related to urinary retention and resolved once Kelley catheter placed)  Mild hyperglycemia (elevated blood sugar)  Anxiety  Depression  ADHD      Medications: Per above medication reconciliation. Pain Management: per above medications    Recommended diet: {diet:96516}    Recommended activity: {discharge activity:45917}    Wound care: {WOUND [facility]:12348808}    Indwelling devices:  {INDWELLING DEVICES:09875119}    Supplemental Oxygen: {SUPPLEMENTAL 02:19964452}    Required Lab work: {REQUIRED LAB HWTB:45501767}    Glucose management:  {GLUCOSE MANAGEMENT:67804694}    Code status: {Status:89583}        Outside physician follow up: Follow-up Information     Follow up With Specialties Details Why Contact Info    [facility]  Call to schedule follow-up appointment to be seen within 3 days of hospital discharge [address] [place] [address]  [phone]      Kennedy Estevez MD Gastroenterology  This is the gastroenterologist who saw you while you are hospitalized. If you continue to have issues please contact his office for follow-up appointment [address] [facility]  [address] [facility], MD Urology  This is the urologist who saw you while hospitalized. If you continue to have issues please contact his office for follow-up appointment [facility]  [phone]            Information obtained by :  I understand that if any problems occur once I am at home I am to contact my physician. I understand and acknowledge receipt of the instructions indicated above. Physician's or R.N.'s Signature                                                                  Date/Time                                                                                                                                              Patient or Representative

## 2021-07-21 NOTE — PROGRESS NOTES
Patient reports some improvement in abdominal pain today. He has been taking his IV hydromorphone consistently as well as Percocet and ketorolac. He states that his goal is to go home today. I discussed with him that we will increase the interval on his IV hydromorphone to every 4 hours. He seems to understand that we need to wean his IV opioid prior to discharge to determine what his oral analgesic needs will be. He does state that once he gets home he will \"supplement\" his pain medicine with marijuana. He was counseled regarding the potential for cannabis induced cyclic hyperemesis, but he states that he is not experienced this. He is a regular user. Conversation took place in the presence of the patient's girlfriend.

## 2021-07-21 NOTE — PROGRESS NOTES
Problem: Discharge Planning  Goal: *Discharge to safe environment  Outcome: Progressing Towards Goal     Problem: Falls - Risk of  Goal: *Absence of Falls  Description: Document Pepe Agustin Fall Risk and appropriate interventions in the flowsheet.   Outcome: Progressing Towards Goal  Note: Fall Risk Interventions:            Medication Interventions: Evaluate medications/consider consulting pharmacy, Patient to call before getting OOB, Teach patient to arise slowly                   Problem: Patient Education: Go to Patient Education Activity  Goal: Patient/Family Education  Outcome: Progressing Towards Goal

## 2021-07-21 NOTE — PROGRESS NOTES
Bedside shift change report given to [facility] (oncoming nurse) by Lynne Garcia (offgoing nurse). Report included the following information SBAR, Kardex, Intake/Output, MAR and Recent Results.

## 2021-07-22 NOTE — PROGRESS NOTES
I have reviewed discharge instructions with the patient. The patient verbalized understanding. Pt given prescriptions prior to discharge. Pt left with family via private vehicle.

## 2021-08-11 ENCOUNTER — OFFICE VISIT (OUTPATIENT)
Dept: NEUROLOGY | Age: 31
End: 2021-08-11
Payer: MEDICAID

## 2021-08-11 VITALS
SYSTOLIC BLOOD PRESSURE: 124 MMHG | HEIGHT: 71 IN | WEIGHT: 212.6 LBS | DIASTOLIC BLOOD PRESSURE: 78 MMHG | OXYGEN SATURATION: 97 % | HEART RATE: 83 BPM | BODY MASS INDEX: 29.76 KG/M2

## 2021-08-11 PROCEDURE — 99214 OFFICE O/P EST MOD 30 MIN: CPT | Performed by: NURSE PRACTITIONER

## 2021-08-11 RX ORDER — RIZATRIPTAN BENZOATE 10 MG/1
TABLET, ORALLY DISINTEGRATING ORAL
Qty: 9 TABLET | Refills: 2 | Status: SHIPPED | OUTPATIENT
Start: 2021-08-11 | End: 2021-12-01 | Stop reason: ALTCHOICE

## 2021-08-11 RX ORDER — ONABOTULINUMTOXINA 200 [USP'U]/1
INJECTION, POWDER, LYOPHILIZED, FOR SOLUTION INTRADERMAL; INTRAMUSCULAR
Qty: 1 VIAL | Refills: 5 | Status: SHIPPED | OUTPATIENT
Start: 2021-08-11 | End: 2021-12-01 | Stop reason: ALTCHOICE

## 2021-08-11 RX ORDER — GABAPENTIN 800 MG/1
800 TABLET ORAL 3 TIMES DAILY
COMMUNITY
End: 2021-12-01 | Stop reason: ALTCHOICE

## 2021-08-11 RX ORDER — ONABOTULINUMTOXINA 200 [USP'U]/1
INJECTION, POWDER, LYOPHILIZED, FOR SOLUTION INTRADERMAL; INTRAMUSCULAR
Qty: 1 VIAL | Refills: 5 | Status: SHIPPED | OUTPATIENT
Start: 2021-08-11 | End: 2021-08-11 | Stop reason: SDUPTHER

## 2021-08-11 RX ORDER — PREDNISONE 10 MG/1
TABLET ORAL
Qty: 42 TABLET | Refills: 0 | Status: SHIPPED | OUTPATIENT
Start: 2021-08-11 | End: 2021-08-27 | Stop reason: ALTCHOICE

## 2021-08-11 NOTE — TELEPHONE ENCOUNTER
Re botox ; [facility] at [phone] will call for delivery date when verified with patient. Status awaiting verification.

## 2021-08-11 NOTE — PROGRESS NOTES
Cynthia Guerrero is a 31 y.o. male who presents with the following  Chief Complaint   Patient presents with    Follow-up     migraine x 3 mos. continual.       HPI    FU chronic migraine. Currently on Aimovig -  No real results. Failed Emgality, AED, SSRI and can not take BP medications   Has had this current migraine X 3 months per report   Located on the top of his head. Has caused him to have to stop working, debilitating, intense, sharp pains. He is unable to function day to day due to this headache  He has some light sensitivity. No LOC or syncope  Has a few hours relief with Imitrex but that is it. Has tried [place] and [facility], Nurtec, Fioricet- no relief. All day and night pain. GF agrees he is miserable. Unable to focus, memory is foggy. Never had one this bad  Has had [facility] come and no relief X a few visits. No Known Allergies    Current Outpatient Medications   Medication Sig    gabapentin (NEURONTIN) 800 mg tablet Take 800 mg by mouth three (3) times daily. Occasionally takes BID, but usually TID    Lisdexamfetamine (Vyvanse) 40 mg capsule Take 40 mg by mouth every morning.  predniSONE (DELTASONE) 10 mg tablet 6 po x2 days, 5 po x 2days, 4 po x 2days, 3 po x2days, 2 po x2days, 1 po x 2days    onabotulinumtoxinA (Botox) 200 unit injection Inject 155 units into 31 FDA indicated and sites in the head, face, neck every 3 months for chronic migraine    rizatriptan (Maxalt-MLT) 10 mg disintegrating tablet 1 tab at onset of migraine; repeat 1 tab in 2 hours if HA remains; Limit: 2 tabs in 24 hours, max 3 days/ week. 30 Day Rx    guanFACINE ER (INTUNIV) 2 mg ER tablet Take 2 mg by mouth daily.  mirtazapine (REMERON) 30 mg tablet Take 30 mg by mouth nightly.  polyethylene glycol (MIRALAX) 17 gram packet Take 1 Packet by mouth two (2) times a day.  naloxone (Narcan) 4 mg/actuation nasal spray Use 1 spray intranasally, then discard.  Repeat with new spray every 2 min as needed for opioid overdose symptoms, alternating nostrils.  erenumab-aooe (Aimovig Autoinjector) 140 mg/mL injection 1 mL by SubCUTAneous route every thirty (30) days.  SUMAtriptan (IMITREX) 100 mg tablet 1 at HA onset and repeat in 2 hours if needed. Max 2 in 24 hours    Desvenlafaxine 100 mg Tb24 Take 100 mg by mouth daily. No current facility-administered medications for this visit. Social History     Tobacco Use   Smoking Status Current Some Day Smoker   Smokeless Tobacco Never Used       Past Medical History:   Diagnosis Date    Alcohol abuse     Anxiety and depression     Migraine     Seizures ([address]        Past Surgical History:   Procedure Laterality Date    HX ORTHOPAEDIC  2010    hip    HX ORTHOPAEDIC  2019    wrist       No family history on file. Social History     Socioeconomic History    Marital status: SINGLE     Spouse name: Not on file    Number of children: Not on file    Years of education: Not on file    Highest education level: Not on file   Tobacco Use    Smoking status: Current Some Day Smoker    Smokeless tobacco: Never Used   Substance and Sexual Activity    Alcohol use: Yes    Drug use: Yes     Types: Marijuana     Comment: \"not in a few months\"     Social Determinants of Health     Financial Resource Strain:     Difficulty of Paying Living Expenses:    Food Insecurity:     Worried About Running Out of Food in the Last Year:     [address] in the Last Year:    Transportation Needs:     Lack of Transportation (Medical):      Lack of Transportation (Non-Medical):    Physical Activity:     Days of Exercise per Week:     Minutes of Exercise per Session:    Stress:     Feeling of Stress :    Social Connections:     Frequency of Communication with Friends and Family:     Frequency of Social Gatherings with Friends and Family:     Attends Mandaeism Services:     Active Member of Clubs or Organizations:     Attends Club or Organization Meetings:    Shon Nicole Marital Status:        Review of Systems   Eyes: Positive for photophobia. Negative for blurred vision and double vision. Respiratory: Negative for shortness of breath and wheezing. Cardiovascular: Negative for chest pain and palpitations. Gastrointestinal: Negative for nausea and vomiting. Neurological: Positive for headaches. Negative for dizziness, seizures and loss of consciousness. Psychiatric/Behavioral: Positive for memory loss. Remainder of comprehensive review is negative. Physical Exam :    Visit Vitals  /78   Pulse 83   Ht 5' 11\" (1.803 m)   Wt 96.4 kg (212 lb 9.6 oz)   SpO2 97%   BMI 29.65 kg/m²       General: Well defined, nourished, and groomed individual in no acute distress.    Neck: Supple, nontender, no bruits, no pain with resistance to active range of motion.    Musculoskeletal: Extremities revealed no edema and had full range of motion of joints.    Psych: Good mood and bright affect    NEUROLOGICAL EXAMINATION:    Mental Status: Alert and oriented to person, place, and time    Cranial Nerves:    II, III, IV, VI: Visual acuity grossly intact. Visual fields are normal.    Pupils are equal, round, and reactive to light and accommodation.    Extra-ocular movements are full and fluid. Fundoscopic exam was benign, no ptosis or nystagmus.    V-XII: Hearing is grossly intact. Facial features are symmetric, with normal sensation and strength. The palate rises symmetrically and the tongue protrudes midline. Sternocleidomastoids 5/5. Motor Examination: Normal tone, bulk, and strength, 5/5 muscle strength throughout. Coordination: Finger to nose was normal. No resting or intention tremor    Gait and Station: Steady while walking. Normal arm swing. No pronator drift. No muscle wasting or fasiculations noted. Reflexes: DTRs 2+ throughout.         Results for orders placed or performed during the hospital encounter of 07/18/21   COVID-19 RAPID TEST   Result Value Ref Range    Specimen source Nasopharyngeal      COVID-19 rapid test Not detected NOTD     METABOLIC PANEL, COMPREHENSIVE   Result Value Ref Range    Sodium 138 136 - 145 mmol/L    Potassium 3.6 3.5 - 5.1 mmol/L    Chloride 106 97 - 108 mmol/L    CO2 25 21 - 32 mmol/L    Anion gap 7 5 - 15 mmol/L    Glucose 126 (H) 65 - 100 mg/dL    BUN 17 6 - 20 MG/DL    Creatinine 1.02 0.70 - 1.30 MG/DL    BUN/Creatinine ratio 17 12 - 20      GFR est AA >60 >60 ml/min/1.73m2    GFR est non-AA >60 >60 ml/min/1.73m2    Calcium 9.7 8.5 - 10.1 MG/DL    Bilirubin, total 1.0 0.2 - 1.0 MG/DL    ALT (SGPT) 99 (H) 12 - 78 U/L    AST (SGOT) 136 (H) 15 - 37 U/L    Alk. phosphatase 146 (H) 45 - 117 U/L    Protein, total 8.4 (H) 6.4 - 8.2 g/dL    Albumin 4.4 3.5 - 5.0 g/dL    Globulin 4.0 2.0 - 4.0 g/dL    A-G Ratio 1.1 1.1 - 2.2     CBC WITH AUTOMATED DIFF   Result Value Ref Range    WBC 12.7 (H) 4.1 - 11.1 K/uL    RBC 5.18 4.10 - 5.70 M/uL    HGB 15.2 12.1 - 17.0 g/dL    HCT 42.8 36.6 - 50.3 %    MCV 82.6 80.0 - 99.0 FL    MCH 29.3 26.0 - 34.0 PG    MCHC 35.5 30.0 - 36.5 g/dL    RDW 14.0 11.5 - 14.5 %    PLATELET 907 120 - 382 K/uL    MPV 9.9 8.9 - 12.9 FL    NRBC 0.0 0  WBC    ABSOLUTE NRBC 0.00 0.00 - 0.01 K/uL    NEUTROPHILS 73 32 - 75 %    LYMPHOCYTES 17 12 - 49 %    MONOCYTES 6 5 - 13 %    EOSINOPHILS 2 0 - 7 %    BASOPHILS 1 0 - 1 %    IMMATURE GRANULOCYTES 1 (H) 0.0 - 0.5 %    ABS. NEUTROPHILS 9.6 (H) 1.8 - 8.0 K/UL    ABS. LYMPHOCYTES 2.1 0.8 - 3.5 K/UL    ABS. MONOCYTES 0.7 0.0 - 1.0 K/UL    ABS. EOSINOPHILS 0.2 0.0 - 0.4 K/UL    ABS. BASOPHILS 0.1 0.0 - 0.1 K/UL    ABS. IMM.  GRANS. 0.1 (H) 0.00 - 0.04 K/UL    DF AUTOMATED     ETHYL ALCOHOL   Result Value Ref Range    ALCOHOL(ETHYL),SERUM <10 <10 MG/DL   SAMPLES BEING HELD   Result Value Ref Range    SAMPLES BEING HELD 2silver bc bottles     COMMENT        Add-on orders for these samples will be processed based on acceptable specimen integrity and analyte stability, which may vary by analyte. LIPASE   Result Value Ref Range    Lipase 588 (H) 73 - 393 U/L   HEPATITIS PANEL, ACUTE   Result Value Ref Range    Hepatitis A, IgM NONREACTIVE NR      __          Hepatitis B surface Ag <0.10 Index    Hep B surface Ag Interp. Negative NEG      __          Hepatitis B core, IgM NONREACTIVE NR      __          Hep C virus Ab Interp. NONREACTIVE NR      Hep C  virus Ab comment Method used is Siemens Advia Centaur     LIPASE   Result Value Ref Range    Lipase 38 (L) 73 - 393 U/L   C REACTIVE PROTEIN, QT   Result Value Ref Range    C-Reactive protein 1.52 (H) 0.00 - 0.60 mg/dL   CBC WITH AUTOMATED DIFF   Result Value Ref Range    WBC 7.1 4.1 - 11.1 K/uL    RBC 4.11 4.10 - 5.70 M/uL    HGB 12.1 12.1 - 17.0 g/dL    HCT 35.2 (L) 36.6 - 50.3 %    MCV 85.6 80.0 - 99.0 FL    MCH 29.4 26.0 - 34.0 PG    MCHC 34.4 30.0 - 36.5 g/dL    RDW 14.4 11.5 - 14.5 %    PLATELET 209 062 - 603 K/uL    MPV 10.7 8.9 - 12.9 FL    NRBC 0.0 0  WBC    ABSOLUTE NRBC 0.00 0.00 - 0.01 K/uL    NEUTROPHILS 50 32 - 75 %    LYMPHOCYTES 38 12 - 49 %    MONOCYTES 6 5 - 13 %    EOSINOPHILS 5 0 - 7 %    BASOPHILS 1 0 - 1 %    IMMATURE GRANULOCYTES 0 0.0 - 0.5 %    ABS. NEUTROPHILS 3.5 1.8 - 8.0 K/UL    ABS. LYMPHOCYTES 2.7 0.8 - 3.5 K/UL    ABS. MONOCYTES 0.5 0.0 - 1.0 K/UL    ABS. EOSINOPHILS 0.3 0.0 - 0.4 K/UL    ABS. BASOPHILS 0.1 0.0 - 0.1 K/UL    ABS. IMM.  GRANS. 0.0 0.00 - 0.04 K/UL    DF AUTOMATED     METABOLIC PANEL, COMPREHENSIVE   Result Value Ref Range    Sodium 137 136 - 145 mmol/L    Potassium 3.9 3.5 - 5.1 mmol/L    Chloride 105 97 - 108 mmol/L    CO2 27 21 - 32 mmol/L    Anion gap 5 5 - 15 mmol/L    Glucose 90 65 - 100 mg/dL    BUN 15 6 - 20 MG/DL    Creatinine 0.89 0.70 - 1.30 MG/DL    BUN/Creatinine ratio 17 12 - 20      GFR est AA >60 >60 ml/min/1.73m2    GFR est non-AA >60 >60 ml/min/1.73m2    Calcium 8.5 8.5 - 10.1 MG/DL    Bilirubin, total 0.3 0.2 - 1.0 MG/DL    ALT (SGPT) 152 (H) 12 - 78 U/L    AST (SGOT) 83 (H) 15 - 37 U/L    Alk. phosphatase 135 (H) 45 - 117 U/L    Protein, total 7.0 6.4 - 8.2 g/dL    Albumin 3.7 3.5 - 5.0 g/dL    Globulin 3.3 2.0 - 4.0 g/dL    A-G Ratio 1.1 1.1 - 2.2     CBC WITH AUTOMATED DIFF   Result Value Ref Range    WBC 5.8 4.1 - 11.1 K/uL    RBC 4.41 4.10 - 5.70 M/uL    HGB 13.0 12.1 - 17.0 g/dL    HCT 38.8 36.6 - 50.3 %    MCV 88.0 80.0 - 99.0 FL    MCH 29.5 26.0 - 34.0 PG    MCHC 33.5 30.0 - 36.5 g/dL    RDW 14.7 (H) 11.5 - 14.5 %    PLATELET 189 475 - 314 K/uL    MPV 10.2 8.9 - 12.9 FL    NRBC 0.0 0  WBC    ABSOLUTE NRBC 0.00 0.00 - 0.01 K/uL    NEUTROPHILS 39 32 - 75 %    LYMPHOCYTES 50 (H) 12 - 49 %    MONOCYTES 6 5 - 13 %    EOSINOPHILS 4 0 - 7 %    BASOPHILS 1 0 - 1 %    IMMATURE GRANULOCYTES 0 0.0 - 0.5 %    ABS. NEUTROPHILS 2.2 1.8 - 8.0 K/UL    ABS. LYMPHOCYTES 2.9 0.8 - 3.5 K/UL    ABS. MONOCYTES 0.3 0.0 - 1.0 K/UL    ABS. EOSINOPHILS 0.2 0.0 - 0.4 K/UL    ABS. BASOPHILS 0.1 0.0 - 0.1 K/UL    ABS. IMM. GRANS. 0.0 0.00 - 0.04 K/UL    DF AUTOMATED     METABOLIC PANEL, COMPREHENSIVE   Result Value Ref Range    Sodium 143 136 - 145 mmol/L    Potassium 4.5 3.5 - 5.1 mmol/L    Chloride 113 (H) 97 - 108 mmol/L    CO2 27 21 - 32 mmol/L    Anion gap 3 (L) 5 - 15 mmol/L    Glucose 80 65 - 100 mg/dL    BUN 7 6 - 20 MG/DL    Creatinine 0.90 0.70 - 1.30 MG/DL    BUN/Creatinine ratio 8 (L) 12 - 20      GFR est AA >60 >60 ml/min/1.73m2    GFR est non-AA >60 >60 ml/min/1.73m2    Calcium 8.4 (L) 8.5 - 10.1 MG/DL    Bilirubin, total 0.2 0.2 - 1.0 MG/DL    ALT (SGPT) 97 (H) 12 - 78 U/L    AST (SGOT) 32 15 - 37 U/L    Alk.  phosphatase 112 45 - 117 U/L    Protein, total 7.0 6.4 - 8.2 g/dL    Albumin 3.4 (L) 3.5 - 5.0 g/dL    Globulin 3.6 2.0 - 4.0 g/dL    A-G Ratio 0.9 (L) 1.1 - 2.2     LIPASE   Result Value Ref Range    Lipase 47 (L) 73 - 393 U/L       Orders Placed This Encounter    REFERRAL TO NEUROLOGY     Referral Priority:   Routine     Referral Type:   Consultation     Referral Reason: Specialty Services Required     Referred to Provider:   Gloria Branham NP     Number of Visits Requested:   1    gabapentin (NEURONTIN) 800 mg tablet     Sig: Take 800 mg by mouth three (3) times daily. Occasionally takes BID, but usually TID    Lisdexamfetamine (Vyvanse) 40 mg capsule     Sig: Take 40 mg by mouth every morning.  predniSONE (DELTASONE) 10 mg tablet     Si po x2 days, 5 po x 2days, 4 po x 2days, 3 po x2days, 2 po x2days, 1 po x 2days     Dispense:  42 Tablet     Refill:  0    onabotulinumtoxinA (Botox) 200 unit injection     Sig: Inject 155 units into 31 FDA indicated and sites in the head, face, neck every 3 months for chronic migraine     Dispense:  1 Vial     Refill:  5    rizatriptan (Maxalt-MLT) 10 mg disintegrating tablet     Si tab at onset of migraine; repeat 1 tab in 2 hours if HA remains; Limit: 2 tabs in 24 hours, max 3 days/ week. 30 Day Rx     Dispense:  9 Tablet     Refill:  2       1. Chronic migraine        chronic migraine. Try prednisone to break cycle of current intractable migraine. If no response OZZY X 3 days   Try Maxalt for PRN   Used GF ubrelvy, Nurtec no results. Try Reyvow samples also to break apart HA. Initiate Botox for chronic migraine   Failed Aimovig, Emgality, on Gabapentin, Remeron. Failed Topamax, Elavil. NO BP medications due to BP   FU for treatment.              This note will not be viewable in [facility]

## 2021-08-23 ENCOUNTER — TELEPHONE (OUTPATIENT)
Dept: NEUROLOGY | Age: 31
End: 2021-08-23

## 2021-08-23 NOTE — TELEPHONE ENCOUNTER
ORDERED OZZY FOR HIM TOO       Can we also let him know to call his pharmacy for botox.    You sent him an email but he did not read it yet      Thanks

## 2021-08-23 NOTE — TELEPHONE ENCOUNTER
----- Message from Delmer Goltz sent at 8/23/2021 10:15 AM EDT -----  Regarding: BROWN Zayas/Telephone  General Message/Vendor Calls    Caller's first and last name: Patient mom      Reason for call: patient has finished the Prednisone Medication and still having severe headaches and is unable to function and they would like something to help pt get relief or should she take him to the ER       Callback required yes/no and why: yes      Best contact number(s):[phone]      Details to clarify the request: mom would like to have something done today      Lakia MONTESINOS [employer]

## 2021-08-23 NOTE — TELEPHONE ENCOUNTER
Left Message that we have sent orders in for 3 day infusion, eith patient or she should call the infusion center to get scheduled. Left infusion center number.

## 2021-08-23 NOTE — TELEPHONE ENCOUNTER
----- Message from Araseli Rodríguez sent at 8/23/2021  9:29 AM EDT -----  Regarding: NP, Chana/Telephone  Medication Refill    Caller (if not patient):  Mariely Steiner,       Relationship of caller (if not patient):  Girl Friend      Best contact number(s):  PT's [phone] or Pt's Mother Mary [phone]      Name of medication and dosage if known:  \"Prednisone 10mg\"       Is patient out of this medication (yes/no):      Pharmacy name:  [facility] listed in chart? (yes/no):  Yes  Pharmacy phone number: [phone]      Details to clarify the request:   Ms. Soraida Wilson stated, pt's Prednisone is not helping. It was discussed at pt's last visit to start with 3-4 day infusions. Contact pt's mother to  schedule him for infusions. Pt is experiencing cluster migraines. Pt called last Thurs, no response.     [place]

## 2021-08-24 ENCOUNTER — TELEPHONE (OUTPATIENT)
Dept: NEUROLOGY | Age: 31
End: 2021-08-24

## 2021-08-24 RX ORDER — METOCLOPRAMIDE HYDROCHLORIDE 5 MG/ML
10 INJECTION INTRAMUSCULAR; INTRAVENOUS ONCE
Status: COMPLETED | OUTPATIENT
Start: 2021-08-31 | End: 2021-08-31

## 2021-08-24 RX ORDER — DEXAMETHASONE SODIUM PHOSPHATE 4 MG/ML
10 INJECTION, SOLUTION INTRA-ARTICULAR; INTRALESIONAL; INTRAMUSCULAR; INTRAVENOUS; SOFT TISSUE ONCE
Status: COMPLETED | OUTPATIENT
Start: 2021-08-31 | End: 2021-08-31

## 2021-08-24 RX ORDER — ELETRIPTAN HYDROBROMIDE 40 MG/1
TABLET, FILM COATED ORAL
Qty: 6 TABLET | Refills: 2 | Status: SHIPPED | OUTPATIENT
Start: 2021-08-24 | End: 2021-08-27 | Stop reason: ALTCHOICE

## 2021-08-24 RX ORDER — DEXAMETHASONE SODIUM PHOSPHATE 4 MG/ML
10 INJECTION, SOLUTION INTRA-ARTICULAR; INTRALESIONAL; INTRAMUSCULAR; INTRAVENOUS; SOFT TISSUE ONCE
Status: COMPLETED | OUTPATIENT
Start: 2021-08-30 | End: 2021-08-30

## 2021-08-24 RX ORDER — DIPHENHYDRAMINE HYDROCHLORIDE 50 MG/ML
25 INJECTION, SOLUTION INTRAMUSCULAR; INTRAVENOUS ONCE
Status: COMPLETED | OUTPATIENT
Start: 2021-08-30 | End: 2021-08-30

## 2021-08-24 RX ORDER — SODIUM CHLORIDE 9 MG/ML
25 INJECTION, SOLUTION INTRAVENOUS ONCE
Status: COMPLETED | OUTPATIENT
Start: 2021-08-30 | End: 2021-08-30

## 2021-08-24 RX ORDER — LASMIDITAN 100 MG/1
1 TABLET ORAL
Qty: 8 TABLET | Refills: 5 | Status: SHIPPED | OUTPATIENT
Start: 2021-08-24 | End: 2021-08-27 | Stop reason: ALTCHOICE

## 2021-08-24 RX ORDER — DIPHENHYDRAMINE HYDROCHLORIDE 50 MG/ML
25 INJECTION, SOLUTION INTRAMUSCULAR; INTRAVENOUS ONCE
Status: COMPLETED | OUTPATIENT
Start: 2021-08-31 | End: 2021-08-31

## 2021-08-24 RX ORDER — METOCLOPRAMIDE HYDROCHLORIDE 5 MG/ML
10 INJECTION INTRAMUSCULAR; INTRAVENOUS ONCE
Status: COMPLETED | OUTPATIENT
Start: 2021-08-30 | End: 2021-08-30

## 2021-08-24 RX ORDER — SODIUM CHLORIDE 9 MG/ML
25 INJECTION, SOLUTION INTRAVENOUS ONCE
Status: COMPLETED | OUTPATIENT
Start: 2021-08-31 | End: 2021-08-31

## 2021-08-24 NOTE — TELEPHONE ENCOUNTER
Sent Trokendi XR to be taken DAILY to calm things down   Start that today. And take 1 capsule daily       Send Reyvow to help break up cycle. Also send Relpax to try to break the cycle.      If he is done with his previous medrl pack I can send a higher dose prednisone if he wants  Let me know   Thanks

## 2021-08-24 NOTE — TELEPHONE ENCOUNTER
Spoke with mother. Patient's portal is not working. Patient has been laid out on the couch and non functional for days, and this headache has been going on for 3 mos now. Mother is extremely worried about patient. He did not go to the ER because they will just give him a couple of shots that will only help for a little while and the headache will come right back. The [facility] infusions are scheduled to start on Monday. Gave her the phone number to the [facility] pharmacy to arrange delivery. She is wanting something done now.

## 2021-08-24 NOTE — TELEPHONE ENCOUNTER
Patient's mother is in the office trying to get some help for patient, states that patient has taken Prednisone and is still having headaches. Mother states that patient is scheduled to have infusions 3 days next week, mother also wants to know if patient can get Botox injection on the day that it arrives to office.  Please call mother at [phone]

## 2021-08-25 RX ORDER — DEXAMETHASONE SODIUM PHOSPHATE 4 MG/ML
10 INJECTION, SOLUTION INTRA-ARTICULAR; INTRALESIONAL; INTRAMUSCULAR; INTRAVENOUS; SOFT TISSUE ONCE
Status: COMPLETED | OUTPATIENT
Start: 2021-09-01 | End: 2021-09-01

## 2021-08-25 RX ORDER — DIPHENHYDRAMINE HYDROCHLORIDE 50 MG/ML
25 INJECTION, SOLUTION INTRAMUSCULAR; INTRAVENOUS ONCE
Status: COMPLETED | OUTPATIENT
Start: 2021-09-01 | End: 2021-09-01

## 2021-08-25 RX ORDER — SODIUM CHLORIDE 9 MG/ML
25 INJECTION, SOLUTION INTRAVENOUS ONCE
Status: COMPLETED | OUTPATIENT
Start: 2021-09-01 | End: 2021-09-01

## 2021-08-25 RX ORDER — METOCLOPRAMIDE HYDROCHLORIDE 5 MG/ML
10 INJECTION INTRAMUSCULAR; INTRAVENOUS ONCE
Status: COMPLETED | OUTPATIENT
Start: 2021-09-01 | End: 2021-09-01

## 2021-08-27 ENCOUNTER — OFFICE VISIT (OUTPATIENT)
Dept: NEUROLOGY | Age: 31
End: 2021-08-27
Payer: MEDICAID

## 2021-08-27 ENCOUNTER — TELEPHONE (OUTPATIENT)
Dept: NEUROLOGY | Age: 31
End: 2021-08-27

## 2021-08-27 PROCEDURE — 64615 CHEMODENERV MUSC MIGRAINE: CPT | Performed by: NURSE PRACTITIONER

## 2021-08-27 RX ORDER — ZOLMITRIPTAN 5 MG/1
TABLET, FILM COATED ORAL
Qty: 9 TABLET | Refills: 0 | Status: SHIPPED | OUTPATIENT
Start: 2021-08-27 | End: 2021-10-04 | Stop reason: ALTCHOICE

## 2021-08-27 RX ORDER — NORTRIPTYLINE HYDROCHLORIDE 50 MG/1
50-100 CAPSULE ORAL
Qty: 60 CAPSULE | Refills: 1 | Status: SHIPPED | OUTPATIENT
Start: 2021-08-27 | End: 2021-09-07 | Stop reason: ALTCHOICE

## 2021-08-27 RX ORDER — DICLOFENAC SODIUM 75 MG/1
TABLET, DELAYED RELEASE ORAL
Qty: 60 TABLET | Refills: 0 | Status: SHIPPED | OUTPATIENT
Start: 2021-08-27 | End: 2021-10-04 | Stop reason: ALTCHOICE

## 2021-08-27 NOTE — PROGRESS NOTES
[facility]  OFFICE PROCEDURE PROGRESS NOTE        Chart reviewed for the following:   I, [de-identified] M BROWN Zayas, have reviewed the History, Physical and updated the Allergic reactions for [address] performed immediately prior to start of procedure:   I, Junius Shone, NP, have performed the following reviews on Carmen Barnes prior to the start of the procedure:            * Patient was identified by name and date of birth   * Agreement on procedure being performed was verified  * Risks and Benefits explained to the patient  * Procedure site verified and marked as necessary  * Patient was positioned for comfort  * Consent was signed and verified     Time: 1130      Date of procedure: 8/27/2021    Procedure performed by:  Junius Shone, NP    Provider assisted by: None    Patient assisted by: None    How tolerated by patient: tolerated the procedure well with no complications    Post Procedural Pain Scale: 2 - Hurts Little Bit    Comments: None    Botox Injection Note       Indication: patient has chronic recurrent migraine, has greater than 15 migraine headaches per month, has failed two or more categories of preventatives    Procedure:   Botox concentration: 200 units in 4 ml of preservative-free normal saline. 31 sites injections, distribution as follow      Units/site  Sites Sides Subtotal    Procerus 5 1 1 5    5 1 2 10   Frontalis 5 2 2 20   Temporalis 5 4 2 40   Occipitalis 5 3 2 30   Upper cervical paraspinalis 5 2 2 20   Trapezius 5 3 2 30         200 units Botox were reconstituted, 155 units injected as above and the remainder was unavoidably wasted.      Patient tolerated procedure well.     ________________________  Coretha Batters

## 2021-08-30 ENCOUNTER — HOSPITAL ENCOUNTER (OUTPATIENT)
Dept: INFUSION THERAPY | Age: 31
Discharge: HOME | End: 2021-08-30
Payer: MEDICAID

## 2021-08-30 VITALS
HEIGHT: 71 IN | TEMPERATURE: 98.7 F | SYSTOLIC BLOOD PRESSURE: 118 MMHG | RESPIRATION RATE: 18 BRPM | BODY MASS INDEX: 28.98 KG/M2 | DIASTOLIC BLOOD PRESSURE: 67 MMHG | WEIGHT: 207 LBS | HEART RATE: 67 BPM | OXYGEN SATURATION: 97 %

## 2021-08-30 PROCEDURE — 74011250636 HC RX REV CODE- 250/636: Performed by: NURSE PRACTITIONER

## 2021-08-30 PROCEDURE — 74011000250 HC RX REV CODE- 250: Performed by: NURSE PRACTITIONER

## 2021-08-30 PROCEDURE — 74011000258 HC RX REV CODE- 258: Performed by: NURSE PRACTITIONER

## 2021-08-30 PROCEDURE — 96375 TX/PRO/DX INJ NEW DRUG ADDON: CPT

## 2021-08-30 PROCEDURE — 96365 THER/PROPH/DIAG IV INF INIT: CPT

## 2021-08-30 RX ADMIN — DEXAMETHASONE SODIUM PHOSPHATE 10 MG: 4 INJECTION INTRA-ARTICULAR; INTRALESIONAL; INTRAMUSCULAR; INTRAVENOUS; SOFT TISSUE at 09:47

## 2021-08-30 RX ADMIN — DIPHENHYDRAMINE HYDROCHLORIDE 25 MG: 50 INJECTION INTRAMUSCULAR; INTRAVENOUS at 09:54

## 2021-08-30 RX ADMIN — METOCLOPRAMIDE HYDROCHLORIDE 10 MG: 5 INJECTION INTRAMUSCULAR; INTRAVENOUS at 10:03

## 2021-08-30 RX ADMIN — DIHYDROERGOTAMINE MESYLATE 1 MG: 1 INJECTION, SOLUTION INTRAMUSCULAR; INTRAVENOUS; SUBCUTANEOUS at 11:00

## 2021-08-30 RX ADMIN — SODIUM CHLORIDE 25 ML/HR: 900 INJECTION, SOLUTION INTRAVENOUS at 09:44

## 2021-08-30 RX ADMIN — VALPROATE SODIUM 500 MG: 100 INJECTION, SOLUTION INTRAVENOUS at 11:20

## 2021-08-31 ENCOUNTER — HOSPITAL ENCOUNTER (OUTPATIENT)
Dept: INFUSION THERAPY | Age: 31
Discharge: HOME | End: 2021-08-31
Payer: MEDICAID

## 2021-08-31 VITALS
DIASTOLIC BLOOD PRESSURE: 63 MMHG | SYSTOLIC BLOOD PRESSURE: 108 MMHG | RESPIRATION RATE: 18 BRPM | HEART RATE: 53 BPM | TEMPERATURE: 97.7 F

## 2021-08-31 PROCEDURE — 96375 TX/PRO/DX INJ NEW DRUG ADDON: CPT

## 2021-08-31 PROCEDURE — 96365 THER/PROPH/DIAG IV INF INIT: CPT

## 2021-08-31 PROCEDURE — 74011000250 HC RX REV CODE- 250: Performed by: NURSE PRACTITIONER

## 2021-08-31 PROCEDURE — 74011250636 HC RX REV CODE- 250/636: Performed by: NURSE PRACTITIONER

## 2021-08-31 PROCEDURE — 74011000258 HC RX REV CODE- 258: Performed by: NURSE PRACTITIONER

## 2021-08-31 RX ADMIN — DEXAMETHASONE SODIUM PHOSPHATE 10 MG: 4 INJECTION INTRA-ARTICULAR; INTRALESIONAL; INTRAMUSCULAR; INTRAVENOUS; SOFT TISSUE at 11:48

## 2021-08-31 RX ADMIN — DIHYDROERGOTAMINE MESYLATE 1 MG: 1 INJECTION, SOLUTION INTRAMUSCULAR; INTRAVENOUS; SUBCUTANEOUS at 12:33

## 2021-08-31 RX ADMIN — METOCLOPRAMIDE HYDROCHLORIDE 10 MG: 5 INJECTION INTRAMUSCULAR; INTRAVENOUS at 11:46

## 2021-08-31 RX ADMIN — VALPROATE SODIUM 500 MG: 100 INJECTION, SOLUTION INTRAVENOUS at 13:00

## 2021-08-31 RX ADMIN — SODIUM CHLORIDE 25 ML/HR: 9 INJECTION, SOLUTION INTRAVENOUS at 11:44

## 2021-08-31 RX ADMIN — DIPHENHYDRAMINE HYDROCHLORIDE 25 MG: 50 INJECTION INTRAMUSCULAR; INTRAVENOUS at 11:52

## 2021-08-31 NOTE — PROGRESS NOTES
[facility] Progress Note        Date: 2021    Name: Xuan Olivares    MRN: [id number]         : 1990    1115  Mr. Shana العلي Arrived ambulatory and in no distress for Day 2 of 3 Raskins Regimen. Assessment was completed, no acute issues at this time, no new complaints voiced. 24G PIV placed in left AC by Ambar Hanson. RN with positive blood return noted. 1. Do you have any symptoms of COVID-19? SOB, coughing, fever, or generally not feeling well NO    2. Have you been exposed to COVID-19 recently? NO    3. Have you had any recent contact with family/friend that has a pending COVID test? NO           Mr. Frances Galicia vitals were reviewed. Patient Vitals for the past 12 hrs:   Temp Pulse Resp BP   21 1429  (!) 53  108/63   21 1116 97.7 °F (36.5 °C) 75 18 (!) 105/56         Lab results were obtained and reviewed. No results found for this or any previous visit (from the past 12 hour(s)).     Medications received:  Medications Administered     0.9% sodium chloride infusion     Admin Date  2021 Action  New Bag Dose  25 mL/hr Rate  25 mL/hr Route  IntraVENous Administered By  Lamont Jin, LUIS ENRIQUE          dexamethasone (DECADRON) 4 mg/mL injection 10 mg     Admin Date  2021 Action  Given Dose  10 mg Route  IntraVENous Administered By  Lamont Jin, LUISE NRIQUE          dihydroergotamine (DHE-45) 1 mg in 0.9% sodium chloride 50 mL, overfill volume 5 mL IVPB     Admin Date  2021 Action  New Bag Dose  1 mg Route  IntraVENous Administered By  Lamont Jin, LUIS ENRIQUE          diphenhydrAMINE (BENADRYL) injection 25 mg     Admin Date  2021 Action  Given Dose  25 mg Route  IntraVENous Administered By  Lamont Jin, LUIS ENRIQUE          metoclopramide HCl (REGLAN) injection 10 mg     Admin Date  2021 Action  Given Dose  10 mg Route  IntraVENous Administered By  Lamont Jin, LUIS ENRIQUE          valproate (DEPACON) 500 mg in 0.9% sodium chloride 50 mL, overfill volume 5 mL IVPB Admin Date  08/31/2021 Action  New Bag Dose  500 mg Rate  60 mL/hr Route  IntraVENous Administered By  Lilo Perez, LUIS ENRIQUE                 Mr. Kev Sykes tolerated treatment well and was discharged from [place] [address] in stable condition at 1435. He is to return on  September 1, 2021 at 1300 for his next appointment.     Adonay Russo RN  August 31, 2021    Future Appointments:  Future Appointments   Date Time Provider Mara Carlson   9/1/2021  1:00 PM SS INF3 CH3 <4H [facility]ICS  ISMAEL   10/1/2021  2:00 PM Shirley Zayas, NP [facility] AMB

## 2021-09-01 ENCOUNTER — HOSPITAL ENCOUNTER (OUTPATIENT)
Dept: INFUSION THERAPY | Age: 31
Discharge: HOME | End: 2021-09-01
Payer: MEDICAID

## 2021-09-01 VITALS
HEART RATE: 58 BPM | RESPIRATION RATE: 18 BRPM | SYSTOLIC BLOOD PRESSURE: 107 MMHG | DIASTOLIC BLOOD PRESSURE: 70 MMHG | TEMPERATURE: 98.7 F

## 2021-09-01 PROCEDURE — 96365 THER/PROPH/DIAG IV INF INIT: CPT

## 2021-09-01 PROCEDURE — 74011000250 HC RX REV CODE- 250: Performed by: NURSE PRACTITIONER

## 2021-09-01 PROCEDURE — 74011250636 HC RX REV CODE- 250/636: Performed by: NURSE PRACTITIONER

## 2021-09-01 PROCEDURE — 74011000258 HC RX REV CODE- 258: Performed by: NURSE PRACTITIONER

## 2021-09-01 PROCEDURE — 96375 TX/PRO/DX INJ NEW DRUG ADDON: CPT

## 2021-09-01 RX ADMIN — DEXAMETHASONE SODIUM PHOSPHATE 10 MG: 4 INJECTION INTRA-ARTICULAR; INTRALESIONAL; INTRAMUSCULAR; INTRAVENOUS; SOFT TISSUE at 13:33

## 2021-09-01 RX ADMIN — METOCLOPRAMIDE HYDROCHLORIDE 10 MG: 5 INJECTION INTRAMUSCULAR; INTRAVENOUS at 13:30

## 2021-09-01 RX ADMIN — VALPROATE SODIUM 500 MG: 100 INJECTION, SOLUTION INTRAVENOUS at 14:38

## 2021-09-01 RX ADMIN — DIHYDROERGOTAMINE MESYLATE 1 MG: 1 INJECTION, SOLUTION INTRAMUSCULAR; INTRAVENOUS; SUBCUTANEOUS at 14:17

## 2021-09-01 RX ADMIN — SODIUM CHLORIDE 25 ML/HR: 9 INJECTION, SOLUTION INTRAVENOUS at 13:30

## 2021-09-01 RX ADMIN — DIPHENHYDRAMINE HYDROCHLORIDE 25 MG: 50 INJECTION INTRAMUSCULAR; INTRAVENOUS at 13:37

## 2021-09-01 NOTE — PROGRESS NOTES
Outpatient Infusion Center   Visit Progress Note    1310 Patient admitted to [facility] for Raskins Day 3/3 in stable condition. Assessment completed. No new concerns voiced. VS  Patient Vitals for the past 12 hrs:   Temp Pulse Resp BP   09/01/21 1546  (!) 58  107/70   09/01/21 1316 98.7 °F (37.1 °C) 83 18 107/62         LAC PIV with positive blood return. Medications:  Medications Administered     0.9% sodium chloride infusion     Admin Date  09/01/2021 Action  New Bag Dose  25 mL/hr Rate  25 mL/hr Route  IntraVENous Administered By  Olive Shaffer RN          dexamethasone (DECADRON) 4 mg/mL injection 10 mg     Admin Date  09/01/2021 Action  Given Dose  10 mg Route  IntraVENous Administered By  Olive Shaffer RN          dihydroergotamine (DHE-45) 1 mg in 0.9% sodium chloride 50 mL, overfill volume 5 mL IVPB     Admin Date  09/01/2021 Action  New Bag Dose  1 mg Route  IntraVENous Administered By  Olive Shaffer RN          diphenhydrAMINE (BENADRYL) injection 25 mg     Admin Date  09/01/2021 Action  Given Dose  25 mg Route  IntraVENous Administered By  Olive Shaffer RN          metoclopramide HCl (REGLAN) injection 10 mg     Admin Date  09/01/2021 Action  Given Dose  10 mg Route  IntraVENous Administered By  Olive Shaffer RN          valproate (DEPACON) 500 mg in 0.9% sodium chloride 50 mL, overfill volume 5 mL IVPB     Admin Date  09/01/2021 Action  New Bag Dose  500 mg Rate  60 mL/hr Route  IntraVENous Administered By  Olive Shaffer RN                  1600 Patient tolerated treatment well. Patient discharged from [facility] in no distress.

## 2021-09-07 ENCOUNTER — TELEPHONE (OUTPATIENT)
Dept: NEUROLOGY | Age: 31
End: 2021-09-07

## 2021-09-07 DIAGNOSIS — R51.9 WORST HEADACHE OF LIFE: ICD-10-CM

## 2021-09-07 DIAGNOSIS — R55 SYNCOPE, UNSPECIFIED SYNCOPE TYPE: ICD-10-CM

## 2021-09-07 DIAGNOSIS — H53.9 VISUAL CHANGES: Primary | ICD-10-CM

## 2021-09-07 RX ORDER — ZONISAMIDE 50 MG/1
50 CAPSULE ORAL 2 TIMES DAILY
Qty: 60 CAPSULE | Refills: 2 | Status: SHIPPED | OUTPATIENT
Start: 2021-09-07 | End: 2021-10-04 | Stop reason: ALTCHOICE

## 2021-09-07 NOTE — TELEPHONE ENCOUNTER
----- Message from Alicia Rodas sent at 9/7/2021  8:19 AM EDT -----  Regarding: Dr. Caroline Zayas  General Message/Vendor Calls    Caller's first and last name: Lily Warren(Mom of Patient)      Reason for call:Intense Migraines       Callback required yes/no and why:yes      Best contact number(s): [phone]      Details to clarify the request:Patient mom had to call in because her son Shana Altman who is a patient of Dr. Goldie Monzon is experiencing intense migraines that have shut his body down and he cannot do anything but lay down and he had them the entire weekend and she called yesterday and left a message she is wanting to know is there anyway to get him seen today or sometime this week because he is in an a lot of pain and cannot drive himself to the doctor. The mother is worrying that her son who is a recovering alcoholic and if not seen or his pain not treated immediately she is afraid of a relapse.        Alicia Rodas

## 2021-09-07 NOTE — TELEPHONE ENCOUNTER
Spoke with mother and reviewed Dimitri's note and the urgency of the STAT CT scan of the head, followed with the MRI brain. She understands and will take him any where he needs to go.

## 2021-09-07 NOTE — TELEPHONE ENCOUNTER
I mean with absolutely NO results from any treatment, botox, and the OZZY infusion X 3 days then we need to get a scan     I put a CT head for STAT for ruling out bleed, tumor. He needs to get that      Then an MRI brain to look closer once something acute is ruled out as seen above. I will [facility] Zonegran [facility] his [facility]   Take twice daily as it should be.

## 2021-09-09 ENCOUNTER — HOSPITAL ENCOUNTER (OUTPATIENT)
Dept: CT IMAGING | Age: 31
Discharge: HOME | End: 2021-09-09
Attending: NURSE PRACTITIONER
Payer: MEDICAID

## 2021-09-09 ENCOUNTER — TELEPHONE (OUTPATIENT)
Dept: NEUROLOGY | Age: 31
End: 2021-09-09

## 2021-09-09 DIAGNOSIS — R55 SYNCOPE, UNSPECIFIED SYNCOPE TYPE: ICD-10-CM

## 2021-09-09 DIAGNOSIS — R51.9 WORST HEADACHE OF LIFE: ICD-10-CM

## 2021-09-09 DIAGNOSIS — H53.9 VISUAL CHANGES: ICD-10-CM

## 2021-09-09 PROCEDURE — 70450 CT HEAD/BRAIN W/O DYE: CPT

## 2021-09-09 NOTE — TELEPHONE ENCOUNTER
Peer to Peer needed for MRI that was done today. It has to be completed by the end of business tomorrow (9/10).     [phone]

## 2021-09-13 ENCOUNTER — TELEPHONE (OUTPATIENT)
Dept: NEUROLOGY | Age: 31
End: 2021-09-13

## 2021-09-13 NOTE — TELEPHONE ENCOUNTER
Spoke with mother, and informed normal CT scan. Continues to have headaches and mother states is \"living on Imitrex\". Asks if the Dakins can be repeated as it has been almost 2 weeks since the last dose. Headaches have \"let up just a little\" over the past 24 hours, but still very bad. Sleeping a lot.

## 2021-09-13 NOTE — TELEPHONE ENCOUNTER
----- Message from Mercedes Olsen sent at 9/13/2021  9:33 AM EDT -----  Regarding: BROWN Zayas / Telephone  General Message/Vendor Calls    Caller's first and last name: Eleno Harper, pt mother      Reason for call: CAT Scan results      Callback required yes/no and why: yes      Best contact number(s): [phone]      Details to clarify the request: caller wanted to receive the results for the CAT scan her son, the pt, had done recently. He is still having severe headaches.        Mercedes Olsen

## 2021-09-16 NOTE — TELEPHONE ENCOUNTER
Spoke with mother and explained the Raskins has been ordered x 5 days and the Reyvow has been approved. Will leave the [facility] scheduling number on her cell voicemail. Called mother back and left scheduling number on her VM.

## 2021-09-20 ENCOUNTER — TELEPHONE (OUTPATIENT)
Dept: NEUROLOGY | Age: 31
End: 2021-09-20

## 2021-09-20 RX ORDER — METOCLOPRAMIDE HYDROCHLORIDE 5 MG/ML
10 INJECTION INTRAMUSCULAR; INTRAVENOUS ONCE
Status: COMPLETED | OUTPATIENT
Start: 2021-09-27 | End: 2021-09-27

## 2021-09-20 RX ORDER — DEXAMETHASONE SODIUM PHOSPHATE 4 MG/ML
10 INJECTION, SOLUTION INTRA-ARTICULAR; INTRALESIONAL; INTRAMUSCULAR; INTRAVENOUS; SOFT TISSUE ONCE
Status: COMPLETED | OUTPATIENT
Start: 2021-09-27 | End: 2021-09-27

## 2021-09-20 RX ORDER — DIPHENHYDRAMINE HYDROCHLORIDE 50 MG/ML
25 INJECTION, SOLUTION INTRAMUSCULAR; INTRAVENOUS ONCE
Status: COMPLETED | OUTPATIENT
Start: 2021-09-27 | End: 2021-09-27

## 2021-09-20 RX ORDER — SODIUM CHLORIDE 9 MG/ML
25 INJECTION, SOLUTION INTRAVENOUS ONCE
Status: COMPLETED | OUTPATIENT
Start: 2021-09-27 | End: 2021-09-27

## 2021-09-20 NOTE — TELEPHONE ENCOUNTER
Mother came into office stating to  staff her son's appointment for today for Raskins had to be cancelled due to lack of PA. sShe left the office asking call back @ [phone]. Spoke with Sebas Mckinney, and was given the number [phone] for [facility]. Called that number, and was only able to speak with  as the nurses were with patients. They have the Raskins order, and the patient is scheduled for 9/27/21- 10/1/21 for the infusions. She does not see a cancelled appointment for today. Explained the reason Mother gave for coming to office.  agreed we do not do the PA for Raskins given at their office. She can only speak with the patient if anyone calls in, however, as the patient is not a minor.   Left message relaying the above information on Shea Thrasher phone ([phone])

## 2021-09-20 NOTE — TELEPHONE ENCOUNTER
----- [facility] sent at 9/20/2021 12:59 PM EDT -----  Regarding: Pierce Otoole  General Message/Vendor Calls    Caller's first and last name: Tomy Landis       Reason for call: Mom Needs A Call Didn't understand VoiceMail About the Prior Authorization       Callback required yes/no and why: yes       Best contact number(s):[phone]      Details to clarify the request: Requesting Nurse Cristin to Give A Call Back       [employer].

## 2021-09-21 RX ORDER — DIPHENHYDRAMINE HYDROCHLORIDE 50 MG/ML
25 INJECTION, SOLUTION INTRAMUSCULAR; INTRAVENOUS ONCE
Status: COMPLETED | OUTPATIENT
Start: 2021-09-28 | End: 2021-09-28

## 2021-09-21 RX ORDER — SODIUM CHLORIDE 9 MG/ML
25 INJECTION, SOLUTION INTRAVENOUS ONCE
Status: COMPLETED | OUTPATIENT
Start: 2021-09-28 | End: 2021-09-28

## 2021-09-21 RX ORDER — DEXAMETHASONE SODIUM PHOSPHATE 100 MG/10ML
10 INJECTION INTRAMUSCULAR; INTRAVENOUS ONCE
Status: COMPLETED | OUTPATIENT
Start: 2021-09-28 | End: 2021-09-28

## 2021-09-21 RX ORDER — METOCLOPRAMIDE HYDROCHLORIDE 5 MG/ML
10 INJECTION INTRAMUSCULAR; INTRAVENOUS ONCE
Status: COMPLETED | OUTPATIENT
Start: 2021-09-28 | End: 2021-09-28

## 2021-09-22 RX ORDER — DEXAMETHASONE SODIUM PHOSPHATE 100 MG/10ML
10 INJECTION INTRAMUSCULAR; INTRAVENOUS ONCE
Status: COMPLETED | OUTPATIENT
Start: 2021-09-29 | End: 2021-09-29

## 2021-09-22 RX ORDER — DIPHENHYDRAMINE HYDROCHLORIDE 50 MG/ML
25 INJECTION, SOLUTION INTRAMUSCULAR; INTRAVENOUS ONCE
Status: COMPLETED | OUTPATIENT
Start: 2021-09-29 | End: 2021-09-29

## 2021-09-22 RX ORDER — METOCLOPRAMIDE HYDROCHLORIDE 5 MG/ML
10 INJECTION INTRAMUSCULAR; INTRAVENOUS ONCE
Status: COMPLETED | OUTPATIENT
Start: 2021-09-29 | End: 2021-09-29

## 2021-09-22 RX ORDER — SODIUM CHLORIDE 9 MG/ML
25 INJECTION, SOLUTION INTRAVENOUS ONCE
Status: COMPLETED | OUTPATIENT
Start: 2021-09-29 | End: 2021-09-29

## 2021-09-23 RX ORDER — DIPHENHYDRAMINE HYDROCHLORIDE 50 MG/ML
25 INJECTION, SOLUTION INTRAMUSCULAR; INTRAVENOUS ONCE
Status: COMPLETED | OUTPATIENT
Start: 2021-09-30 | End: 2021-09-30

## 2021-09-23 RX ORDER — SODIUM CHLORIDE 9 MG/ML
25 INJECTION, SOLUTION INTRAVENOUS ONCE
Status: COMPLETED | OUTPATIENT
Start: 2021-09-30 | End: 2021-09-30

## 2021-09-23 RX ORDER — DEXAMETHASONE SODIUM PHOSPHATE 100 MG/10ML
10 INJECTION INTRAMUSCULAR; INTRAVENOUS ONCE
Status: COMPLETED | OUTPATIENT
Start: 2021-09-30 | End: 2021-09-30

## 2021-09-23 RX ORDER — METOCLOPRAMIDE HYDROCHLORIDE 5 MG/ML
10 INJECTION INTRAMUSCULAR; INTRAVENOUS ONCE
Status: COMPLETED | OUTPATIENT
Start: 2021-09-30 | End: 2021-09-30

## 2021-09-23 NOTE — TELEPHONE ENCOUNTER
Spoke with mother, and the infusion situation is under control now.  Starts infusions on Monday, 9/30/2021 and confirmed appt with Dimitri for 10/1/21 @ 9AM

## 2021-09-24 RX ORDER — DIPHENHYDRAMINE HYDROCHLORIDE 50 MG/ML
25 INJECTION, SOLUTION INTRAMUSCULAR; INTRAVENOUS ONCE
Status: ACTIVE | OUTPATIENT
Start: 2021-10-01 | End: 2021-10-01

## 2021-09-24 RX ORDER — DEXAMETHASONE SODIUM PHOSPHATE 100 MG/10ML
10 INJECTION INTRAMUSCULAR; INTRAVENOUS ONCE
Status: ACTIVE | OUTPATIENT
Start: 2021-10-01 | End: 2021-10-01

## 2021-09-24 RX ORDER — METOCLOPRAMIDE HYDROCHLORIDE 5 MG/ML
10 INJECTION INTRAMUSCULAR; INTRAVENOUS ONCE
Status: ACTIVE | OUTPATIENT
Start: 2021-10-01 | End: 2021-10-01

## 2021-09-24 RX ORDER — SODIUM CHLORIDE 9 MG/ML
25 INJECTION, SOLUTION INTRAVENOUS ONCE
Status: DISPENSED | OUTPATIENT
Start: 2021-10-01 | End: 2021-10-01

## 2021-09-27 ENCOUNTER — HOSPITAL ENCOUNTER (OUTPATIENT)
Dept: INFUSION THERAPY | Age: 31
Discharge: HOME | End: 2021-09-27
Payer: MEDICAID

## 2021-09-27 VITALS
HEART RATE: 64 BPM | RESPIRATION RATE: 16 BRPM | TEMPERATURE: 98 F | OXYGEN SATURATION: 100 % | SYSTOLIC BLOOD PRESSURE: 107 MMHG | DIASTOLIC BLOOD PRESSURE: 64 MMHG

## 2021-09-27 PROCEDURE — 74011250636 HC RX REV CODE- 250/636: Performed by: NURSE PRACTITIONER

## 2021-09-27 PROCEDURE — 96365 THER/PROPH/DIAG IV INF INIT: CPT

## 2021-09-27 PROCEDURE — 74011000250 HC RX REV CODE- 250: Performed by: NURSE PRACTITIONER

## 2021-09-27 PROCEDURE — 74011000258 HC RX REV CODE- 258: Performed by: NURSE PRACTITIONER

## 2021-09-27 PROCEDURE — 96375 TX/PRO/DX INJ NEW DRUG ADDON: CPT

## 2021-09-27 RX ADMIN — METOCLOPRAMIDE HYDROCHLORIDE 10 MG: 5 INJECTION INTRAMUSCULAR; INTRAVENOUS at 11:28

## 2021-09-27 RX ADMIN — DEXAMETHASONE SODIUM PHOSPHATE 10 MG: 4 INJECTION INTRA-ARTICULAR; INTRALESIONAL; INTRAMUSCULAR; INTRAVENOUS; SOFT TISSUE at 11:31

## 2021-09-27 RX ADMIN — SODIUM CHLORIDE 25 ML/HR: 9 INJECTION, SOLUTION INTRAVENOUS at 11:26

## 2021-09-27 RX ADMIN — VALPROATE SODIUM 500 MG: 100 INJECTION, SOLUTION INTRAVENOUS at 12:25

## 2021-09-27 RX ADMIN — DIPHENHYDRAMINE HYDROCHLORIDE 25 MG: 50 INJECTION INTRAMUSCULAR; INTRAVENOUS at 11:35

## 2021-09-27 RX ADMIN — DIHYDROERGOTAMINE MESYLATE 1 MG: 1 INJECTION, SOLUTION INTRAMUSCULAR; INTRAVENOUS; SUBCUTANEOUS at 12:05

## 2021-09-27 NOTE — PROGRESS NOTES
\[facility]\"" Progress Note    Date: 2021    Name: Monique Nunez    MRN: [id number]         : 1990    Mr. Vijay French Arrived ambulatory and in no distress for Day 1 Raskins Regimen. Assessment was completed, no acute issues at this time, no new complaints voiced. PIV placed, +blood. Covid questionnaire completed. 1. Do you have any symptoms of COVID-19? SOB, coughing, fever, or generally not feeling well -no    2. Have you been exposed to COVID-19 recently? - no    3. Have you had any recent contact with family/friend that has a pending COVID test? - no    Mr. Warren's vitals were reviewed.   Patient Vitals for the past 12 hrs:   Temp Pulse Resp BP SpO2   21 1342 98 °F (36.7 °C) 64 16 107/64    21 1105 98.1 °F (36.7 °C) 83 17 117/65 100 %       Medications:  Medications Administered     0.9% sodium chloride infusion     Admin Date  2021 Action  New Bag Dose  25 mL/hr Rate  25 mL/hr Route  IntraVENous Administered By  Ancelmo Jiang RN          dexamethasone (DECADRON) 4 mg/mL injection 10 mg     Admin Date  2021 Action  Given Dose  10 mg Route  IntraVENous Administered By  Ancelmo Jiang RN          dihydroergotamine (DHE-45) 1 mg in 0.9% sodium chloride 50 mL, overfill volume 5 mL IVPB     Admin Date  2021 Action  New Bag Dose  1 mg Route  IntraVENous Administered By  Ancelmo Jiang RN          diphenhydrAMINE (BENADRYL) injection 25 mg     Admin Date  2021 Action  Given Dose  25 mg Route  IntraVENous Administered By  Ancelmo Jiang RN          metoclopramide HCl (REGLAN) injection 10 mg     Admin Date  2021 Action  Given Dose  10 mg Route  IntraVENous Administered By  Ancelmo Jiang RN          valproate (DEPACON) 500 mg in 0.9% sodium chloride 50 mL, overfill volume 5 mL IVPB     Admin Date  2021 Action  New Bag Dose  500 mg Rate  60 mL/hr Route  IntraVENous Administered By  Ancelmo Jiang RN                  Mr. Vijay French tolerated treatment well and was discharged from [place] [address] in stable condition. PIV flushed and removed. He is to return on 9/28/21 for his next appointment.     Dayne Wilde RN  September 27, 2021

## 2021-09-28 ENCOUNTER — HOSPITAL ENCOUNTER (OUTPATIENT)
Dept: INFUSION THERAPY | Age: 31
Discharge: HOME | End: 2021-09-28
Payer: MEDICAID

## 2021-09-28 VITALS
OXYGEN SATURATION: 99 % | DIASTOLIC BLOOD PRESSURE: 55 MMHG | HEART RATE: 65 BPM | SYSTOLIC BLOOD PRESSURE: 111 MMHG | RESPIRATION RATE: 16 BRPM | TEMPERATURE: 98 F

## 2021-09-28 PROCEDURE — 74011250636 HC RX REV CODE- 250/636: Performed by: NURSE PRACTITIONER

## 2021-09-28 PROCEDURE — 74011000250 HC RX REV CODE- 250: Performed by: NURSE PRACTITIONER

## 2021-09-28 PROCEDURE — 96375 TX/PRO/DX INJ NEW DRUG ADDON: CPT

## 2021-09-28 PROCEDURE — 96365 THER/PROPH/DIAG IV INF INIT: CPT

## 2021-09-28 PROCEDURE — 74011000258 HC RX REV CODE- 258: Performed by: NURSE PRACTITIONER

## 2021-09-28 RX ADMIN — DEXAMETHASONE SODIUM PHOSPHATE 10 MG: 10 INJECTION INTRAMUSCULAR; INTRAVENOUS at 11:18

## 2021-09-28 RX ADMIN — METOCLOPRAMIDE HYDROCHLORIDE 10 MG: 5 INJECTION INTRAMUSCULAR; INTRAVENOUS at 11:14

## 2021-09-28 RX ADMIN — DIHYDROERGOTAMINE MESYLATE 1 MG: 1 INJECTION, SOLUTION INTRAMUSCULAR; INTRAVENOUS; SUBCUTANEOUS at 11:50

## 2021-09-28 RX ADMIN — DIPHENHYDRAMINE HYDROCHLORIDE 25 MG: 50 INJECTION INTRAMUSCULAR; INTRAVENOUS at 11:20

## 2021-09-28 RX ADMIN — VALPROATE SODIUM 500 MG: 100 INJECTION, SOLUTION INTRAVENOUS at 12:10

## 2021-09-28 RX ADMIN — SODIUM CHLORIDE 25 ML/HR: 9 INJECTION, SOLUTION INTRAVENOUS at 11:14

## 2021-09-28 NOTE — PROGRESS NOTES
hospitals Progress Note    Date: 2021    Name: Brenda Cabrales    MRN: [id number]         : 1990    Mr. Trey Romano Arrived ambulatory and in no distress for Day 2/ Raskins Regimen. Assessment was completed, no acute issues at this time, pt stated head pain is a 0 out of 10 today. PIV placed, + blood. Covid questionnaire completed. 1. Do you have any symptoms of COVID-19? SOB, coughing, fever, or generally not feeling well - no    2. Have you been exposed to COVID-19 recently? - no    3. Have you had any recent contact with family/friend that has a pending COVID test? - no    Mr. Warren's vitals were reviewed.   Patient Vitals for the past 12 hrs:   Temp Pulse Resp BP SpO2   21 1103 98.4 °F (36.9 °C) 76 17 (!) 122/58 99 %       Medications:  Medications Administered     0.9% sodium chloride infusion     Admin Date  2021 Action  New Bag Dose  25 mL/hr Rate  25 mL/hr Route  IntraVENous Administered By  Sheldongo Weldon, RN          dexamethasone (DECADRON) 10 mg/mL injection 10 mg     Admin Date  2021 Action  Given Dose  10 mg Route  IntraVENous Administered By  Sheldon Heading, RN          dihydroergotamine (DHE-45) 1 mg in 0.9% sodium chloride 50 mL, overfill volume 5 mL IVPB     Admin Date  2021 Action  New Bag Dose  1 mg Route  IntraVENous Administered By  Sheldongo Weldon, RN          diphenhydrAMINE (BENADRYL) injection 25 mg     Admin Date  2021 Action  Given Dose  25 mg Route  IntraVENous Administered By  Sheldon Heading, RN          metoclopramide HCl (REGLAN) injection 10 mg     Admin Date  2021 Action  Given Dose  10 mg Route  IntraVENous Administered By  Sheldon Heading, RN          valproate (DEPACON) 500 mg in 0.9% sodium chloride 50 mL, overfill volume 5 mL IVPB     Admin Date  2021 Action  New Bag Dose  500 mg Rate  60 mL/hr Route  IntraVENous Administered By  Sheldon Heading, RN                  Mr. Trey Romano tolerated treatment well and was [facility] conditio. PIV flushed and removed. He is to return on 9/29/21for his next appointment.     Marlon Bill RN  September 28, 2021

## 2021-09-29 ENCOUNTER — HOSPITAL ENCOUNTER (OUTPATIENT)
Dept: INFUSION THERAPY | Age: 31
Discharge: HOME | End: 2021-09-29
Payer: MEDICAID

## 2021-09-29 VITALS
SYSTOLIC BLOOD PRESSURE: 116 MMHG | TEMPERATURE: 97.6 F | HEART RATE: 82 BPM | DIASTOLIC BLOOD PRESSURE: 73 MMHG | RESPIRATION RATE: 18 BRPM | OXYGEN SATURATION: 94 %

## 2021-09-29 PROCEDURE — 74011000250 HC RX REV CODE- 250: Performed by: NURSE PRACTITIONER

## 2021-09-29 PROCEDURE — 96365 THER/PROPH/DIAG IV INF INIT: CPT

## 2021-09-29 PROCEDURE — 74011000258 HC RX REV CODE- 258: Performed by: NURSE PRACTITIONER

## 2021-09-29 PROCEDURE — 74011250636 HC RX REV CODE- 250/636: Performed by: NURSE PRACTITIONER

## 2021-09-29 PROCEDURE — 96375 TX/PRO/DX INJ NEW DRUG ADDON: CPT

## 2021-09-29 RX ADMIN — METOCLOPRAMIDE HYDROCHLORIDE 10 MG: 5 INJECTION INTRAMUSCULAR; INTRAVENOUS at 11:51

## 2021-09-29 RX ADMIN — DIPHENHYDRAMINE HYDROCHLORIDE 25 MG: 50 INJECTION INTRAMUSCULAR; INTRAVENOUS at 11:58

## 2021-09-29 RX ADMIN — VALPROATE SODIUM 500 MG: 100 INJECTION, SOLUTION INTRAVENOUS at 12:53

## 2021-09-29 RX ADMIN — DEXAMETHASONE SODIUM PHOSPHATE 10 MG: 10 INJECTION INTRAMUSCULAR; INTRAVENOUS at 11:55

## 2021-09-29 RX ADMIN — DIHYDROERGOTAMINE MESYLATE 1 MG: 1 INJECTION, SOLUTION INTRAMUSCULAR; INTRAVENOUS; SUBCUTANEOUS at 12:32

## 2021-09-29 RX ADMIN — SODIUM CHLORIDE 25 ML/HR: 900 INJECTION, SOLUTION INTRAVENOUS at 11:28

## 2021-09-29 NOTE — PROGRESS NOTES
[facility] VISIT NOTE    1110. Pt arrived at [facility] ambulatory and in no distress for Day 3 Raskins. Assessment completed, pt c/o a headache last night but feels good today. 24g PIV placed in  Positive blood return noted and flushes easily. Medications received:  NS KVO  Benadryl IV  Reglan IV  Decadron IV  DHE IV  Depacon IV    Tolerated treatment well, no adverse reaction noted. PIV removed at discharge, no s/s of infiltration noted. Patient Vitals for the past 12 hrs:   Temp Pulse Resp BP SpO2   09/29/21 1400  82  116/73    09/29/21 1114 97.6 °F (36.4 °C) 98 18 123/75 94 %     1400. D/C'd from [facility] ambulatory and in no distress.  Next appointment is 9/30/21 at 11:00 am.

## 2021-09-30 ENCOUNTER — HOSPITAL ENCOUNTER (OUTPATIENT)
Dept: INFUSION THERAPY | Age: 31
Discharge: HOME | End: 2021-09-30
Payer: MEDICAID

## 2021-09-30 VITALS
SYSTOLIC BLOOD PRESSURE: 114 MMHG | RESPIRATION RATE: 18 BRPM | HEART RATE: 71 BPM | TEMPERATURE: 98.1 F | DIASTOLIC BLOOD PRESSURE: 59 MMHG | OXYGEN SATURATION: 95 %

## 2021-09-30 PROCEDURE — 74011000250 HC RX REV CODE- 250: Performed by: NURSE PRACTITIONER

## 2021-09-30 PROCEDURE — 74011250636 HC RX REV CODE- 250/636: Performed by: NURSE PRACTITIONER

## 2021-09-30 PROCEDURE — 96375 TX/PRO/DX INJ NEW DRUG ADDON: CPT

## 2021-09-30 PROCEDURE — 96365 THER/PROPH/DIAG IV INF INIT: CPT

## 2021-09-30 PROCEDURE — 74011000258 HC RX REV CODE- 258: Performed by: NURSE PRACTITIONER

## 2021-09-30 RX ADMIN — DEXAMETHASONE SODIUM PHOSPHATE 10 MG: 10 INJECTION INTRAMUSCULAR; INTRAVENOUS at 11:23

## 2021-09-30 RX ADMIN — DIPHENHYDRAMINE HYDROCHLORIDE 25 MG: 50 INJECTION INTRAMUSCULAR; INTRAVENOUS at 11:26

## 2021-09-30 RX ADMIN — DIHYDROERGOTAMINE MESYLATE 1 MG: 1 INJECTION, SOLUTION INTRAMUSCULAR; INTRAVENOUS; SUBCUTANEOUS at 12:09

## 2021-09-30 RX ADMIN — SODIUM CHLORIDE 25 ML/HR: 9 INJECTION, SOLUTION INTRAVENOUS at 11:19

## 2021-09-30 RX ADMIN — METOCLOPRAMIDE HYDROCHLORIDE 10 MG: 5 INJECTION INTRAMUSCULAR; INTRAVENOUS at 11:20

## 2021-09-30 RX ADMIN — SODIUM CHLORIDE 500 MG: 9 INJECTION, SOLUTION INTRAVENOUS at 12:33

## 2021-09-30 NOTE — PROGRESS NOTES
Outpatient Infusion Center   Visit Progress Note    1100 Patient admitted to [facility] for Raskins day 4/5 in stable condition. Assessment completed. No new concerns voiced. VS  Patient Vitals for the past 12 hrs:   Temp Pulse Resp BP SpO2   09/30/21 1343  71  (!) 114/59    09/30/21 1103 98.1 °F (36.7 °C) 94 18 115/61 95 %         RFA PIV with positive blood return. Medications:  Medications Administered     0.9% sodium chloride infusion     Admin Date  09/30/2021 Action  New Bag Dose  25 mL/hr Rate  25 mL/hr Route  IntraVENous Administered By  Cheyenne Gonzales RN          dexamethasone (DECADRON) 10 mg/mL injection 10 mg     Admin Date  09/30/2021 Action  Given Dose  10 mg Route  IntraVENous Administered By  Cheyenne Gonzales RN          dihydroergotamine (DHE-45) 1 mg in 0.9% sodium chloride 50 mL, overfill volume 5 mL IVPB     Admin Date  09/30/2021 Action  New Bag Dose  1 mg Route  IntraVENous Administered By  Cheyenne Gonzales RN          diphenhydrAMINE (BENADRYL) injection 25 mg     Admin Date  09/30/2021 Action  Given Dose  25 mg Route  IntraVENous Administered By  Cheyenne Gonzales RN          metoclopramide HCl (REGLAN) injection 10 mg     Admin Date  09/30/2021 Action  Given Dose  10 mg Route  IntraVENous Administered By  Cheyenne Gonzales RN          valproate (DEPACON) 500 mg in 0.9% sodium chloride 50 mL, overfill volume 5 mL IVPB     Admin Date  09/30/2021 Action  New Bag Dose  500 mg Rate  60 mL/hr Route  IntraVENous Administered By  Cheyenne Gonzales RN                  [id number] Patient tolerated treatment well. Patient discharged from [place] [address] in no distress. Patient aware of next appointment tomorrow.

## 2021-10-01 ENCOUNTER — HOSPITAL ENCOUNTER (OUTPATIENT)
Dept: INFUSION THERAPY | Age: 31
Discharge: HOME | End: 2021-10-01

## 2021-10-01 DIAGNOSIS — G43.919 INTRACTABLE MIGRAINE WITHOUT STATUS MIGRAINOSUS, UNSPECIFIED MIGRAINE TYPE: Primary | ICD-10-CM

## 2021-10-01 NOTE — PROGRESS NOTES
[facility] Progress Note    Date: 2021    Name: Gin Segura    MRN: [id number]         : 1990    Mr. Verdia Sandhoff Arrived ambulatory for [facility] Day 5. Assessment was completed, patient reported that he woke up with a \"splitting headache\" and took imitrex at 0730 this morning. Notified NP who reported patient could proceed with treatment today, just to hold the DHE. Patient reported he would like to receive the full treatment- notified NP who reported patient can receive Day 5 of treatment on Monday as long as he does not take imitrex. Patient verbalized understanding. He is to return on 10/4/21 for his next appointment.     Alee Rodriguez RN  2021

## 2021-10-02 ENCOUNTER — HOSPITAL ENCOUNTER (EMERGENCY)
Age: 31
Discharge: HOME | End: 2021-10-02
Attending: EMERGENCY MEDICINE | Admitting: EMERGENCY MEDICINE
Payer: MEDICAID

## 2021-10-02 ENCOUNTER — APPOINTMENT (OUTPATIENT)
Dept: CT IMAGING | Age: 31
End: 2021-10-02
Attending: NURSE PRACTITIONER
Payer: MEDICAID

## 2021-10-02 VITALS
TEMPERATURE: 97.5 F | HEART RATE: 86 BPM | OXYGEN SATURATION: 97 % | SYSTOLIC BLOOD PRESSURE: 105 MMHG | RESPIRATION RATE: 16 BRPM | DIASTOLIC BLOOD PRESSURE: 75 MMHG

## 2021-10-02 DIAGNOSIS — G43.109 CHRONIC MIGRAINE WITH AURA: Primary | ICD-10-CM

## 2021-10-02 LAB
ALBUMIN SERPL-MCNC: 3.7 G/DL (ref 3.5–5)
ALBUMIN/GLOB SERPL: 0.9 {RATIO} (ref 1.1–2.2)
ALP SERPL-CCNC: 82 U/L (ref 45–117)
ALT SERPL-CCNC: 24 U/L (ref 12–78)
AMPHET UR QL SCN: NEGATIVE
ANION GAP SERPL CALC-SCNC: 5 MMOL/L (ref 5–15)
AST SERPL-CCNC: 12 U/L (ref 15–37)
BARBITURATES UR QL SCN: POSITIVE
BASOPHILS # BLD: 0.1 K/UL (ref 0–0.1)
BASOPHILS NFR BLD: 1 % (ref 0–1)
BENZODIAZ UR QL: POSITIVE
BILIRUB SERPL-MCNC: 0.2 MG/DL (ref 0.2–1)
BUN SERPL-MCNC: 12 MG/DL (ref 6–20)
BUN/CREAT SERPL: 12 (ref 12–20)
CALCIUM SERPL-MCNC: 9.1 MG/DL (ref 8.5–10.1)
CANNABINOIDS UR QL SCN: POSITIVE
CHLORIDE SERPL-SCNC: 105 MMOL/L (ref 97–108)
CO2 SERPL-SCNC: 27 MMOL/L (ref 21–32)
COCAINE UR QL SCN: NEGATIVE
COMMENT, HOLDF: NORMAL
CREAT SERPL-MCNC: 1 MG/DL (ref 0.7–1.3)
CRP SERPL-MCNC: <0.29 MG/DL (ref 0–0.6)
DIFFERENTIAL METHOD BLD: ABNORMAL
DRUG SCRN COMMENT,DRGCM: ABNORMAL
EOSINOPHIL # BLD: 0.2 K/UL (ref 0–0.4)
EOSINOPHIL NFR BLD: 2 % (ref 0–7)
ERYTHROCYTE [DISTWIDTH] IN BLOOD BY AUTOMATED COUNT: 13.3 % (ref 11.5–14.5)
ERYTHROCYTE [SEDIMENTATION RATE] IN BLOOD: 4 MM/HR (ref 0–15)
ETHANOL SERPL-MCNC: <10 MG/DL
GLOBULIN SER CALC-MCNC: 3.9 G/DL (ref 2–4)
GLUCOSE SERPL-MCNC: 105 MG/DL (ref 65–100)
HCT VFR BLD AUTO: 42.9 % (ref 36.6–50.3)
HGB BLD-MCNC: 15 G/DL (ref 12.1–17)
IMM GRANULOCYTES # BLD AUTO: 0 K/UL (ref 0–0.04)
IMM GRANULOCYTES NFR BLD AUTO: 0 % (ref 0–0.5)
LYMPHOCYTES # BLD: 4.3 K/UL (ref 0.8–3.5)
LYMPHOCYTES NFR BLD: 39 % (ref 12–49)
MCH RBC QN AUTO: 29.6 PG (ref 26–34)
MCHC RBC AUTO-ENTMCNC: 35 G/DL (ref 30–36.5)
MCV RBC AUTO: 84.8 FL (ref 80–99)
METHADONE UR QL: NEGATIVE
MONOCYTES # BLD: 0.6 K/UL (ref 0–1)
MONOCYTES NFR BLD: 5 % (ref 5–13)
NEUTS SEG # BLD: 6 K/UL (ref 1.8–8)
NEUTS SEG NFR BLD: 53 % (ref 32–75)
NRBC # BLD: 0 K/UL (ref 0–0.01)
NRBC BLD-RTO: 0 PER 100 WBC
OPIATES UR QL: NEGATIVE
PCP UR QL: NEGATIVE
PLATELET # BLD AUTO: 282 K/UL (ref 150–400)
PMV BLD AUTO: 9.7 FL (ref 8.9–12.9)
POTASSIUM SERPL-SCNC: 3.7 MMOL/L (ref 3.5–5.1)
PROT SERPL-MCNC: 7.6 G/DL (ref 6.4–8.2)
RBC # BLD AUTO: 5.06 M/UL (ref 4.1–5.7)
SAMPLES BEING HELD,HOLD: NORMAL
SODIUM SERPL-SCNC: 137 MMOL/L (ref 136–145)
WBC # BLD AUTO: 11.2 K/UL (ref 4.1–11.1)

## 2021-10-02 PROCEDURE — 86140 C-REACTIVE PROTEIN: CPT

## 2021-10-02 PROCEDURE — 80307 DRUG TEST PRSMV CHEM ANLYZR: CPT

## 2021-10-02 PROCEDURE — 85652 RBC SED RATE AUTOMATED: CPT

## 2021-10-02 PROCEDURE — 96374 THER/PROPH/DIAG INJ IV PUSH: CPT

## 2021-10-02 PROCEDURE — 74011250637 HC RX REV CODE- 250/637: Performed by: NURSE PRACTITIONER

## 2021-10-02 PROCEDURE — 82077 ASSAY SPEC XCP UR&BREATH IA: CPT

## 2021-10-02 PROCEDURE — 74011250636 HC RX REV CODE- 250/636: Performed by: NURSE PRACTITIONER

## 2021-10-02 PROCEDURE — 99284 EMERGENCY DEPT VISIT MOD MDM: CPT

## 2021-10-02 PROCEDURE — 80053 COMPREHEN METABOLIC PANEL: CPT

## 2021-10-02 PROCEDURE — 96375 TX/PRO/DX INJ NEW DRUG ADDON: CPT

## 2021-10-02 PROCEDURE — 36415 COLL VENOUS BLD VENIPUNCTURE: CPT

## 2021-10-02 PROCEDURE — 70450 CT HEAD/BRAIN W/O DYE: CPT

## 2021-10-02 PROCEDURE — 85025 COMPLETE CBC W/AUTO DIFF WBC: CPT

## 2021-10-02 RX ORDER — PREDNISONE 50 MG/1
50 TABLET ORAL DAILY
Qty: 3 TABLET | Refills: 0 | Status: SHIPPED | OUTPATIENT
Start: 2021-10-02 | End: 2021-10-04 | Stop reason: ALTCHOICE

## 2021-10-02 RX ORDER — DIPHENHYDRAMINE HYDROCHLORIDE 50 MG/ML
25 INJECTION, SOLUTION INTRAMUSCULAR; INTRAVENOUS
Status: COMPLETED | OUTPATIENT
Start: 2021-10-02 | End: 2021-10-02

## 2021-10-02 RX ORDER — KETOROLAC TROMETHAMINE 30 MG/ML
30 INJECTION, SOLUTION INTRAMUSCULAR; INTRAVENOUS
Status: COMPLETED | OUTPATIENT
Start: 2021-10-02 | End: 2021-10-02

## 2021-10-02 RX ORDER — BUTALBITAL, ACETAMINOPHEN AND CAFFEINE 50; 325; 40 MG/1; MG/1; MG/1
2 TABLET ORAL
Status: COMPLETED | OUTPATIENT
Start: 2021-10-02 | End: 2021-10-02

## 2021-10-02 RX ORDER — DEXAMETHASONE SODIUM PHOSPHATE 10 MG/ML
10 INJECTION INTRAMUSCULAR; INTRAVENOUS ONCE
Status: COMPLETED | OUTPATIENT
Start: 2021-10-02 | End: 2021-10-02

## 2021-10-02 RX ORDER — PROMETHAZINE HYDROCHLORIDE 25 MG/1
25 SUPPOSITORY RECTAL
Qty: 12 SUPPOSITORY | Refills: 0 | Status: SHIPPED | OUTPATIENT
Start: 2021-10-02 | End: 2021-10-09

## 2021-10-02 RX ADMIN — BUTALBITAL, ACETAMINOPHEN, AND CAFFEINE 2 TABLET: 50; 325; 40 TABLET ORAL at 22:46

## 2021-10-02 RX ADMIN — SODIUM CHLORIDE 1000 ML: 900 INJECTION, SOLUTION INTRAVENOUS at 22:38

## 2021-10-02 RX ADMIN — KETOROLAC TROMETHAMINE 30 MG: 30 INJECTION, SOLUTION INTRAMUSCULAR at 22:45

## 2021-10-02 RX ADMIN — DIPHENHYDRAMINE HYDROCHLORIDE 25 MG: 50 INJECTION, SOLUTION INTRAMUSCULAR; INTRAVENOUS at 22:40

## 2021-10-02 RX ADMIN — PROCHLORPERAZINE EDISYLATE 10 MG: 5 INJECTION INTRAMUSCULAR; INTRAVENOUS at 22:43

## 2021-10-02 RX ADMIN — DEXAMETHASONE SODIUM PHOSPHATE 10 MG: 10 INJECTION, SOLUTION INTRAMUSCULAR; INTRAVENOUS at 22:42

## 2021-10-03 NOTE — ED NOTES
MD reviewed discharge instructions and options with patient and patient verbalized understanding. RN reviewed discharge instructions using teachback method. Pt ambulated to exit without difficulty and in no signs of acute distress escorted by staff, and his girlfriend  will drive home. No complaints or needs expressed at this time. Patient was counseled on medications prescribed at discharge. VSS, verbalized relief from most intense pain. Patient to call his Neurologist in the morning for appointment.

## 2021-10-03 NOTE — ED PROVIDER NOTES
HPI   Jay Sandoval is a 31 y.o. male with Hx of anxiety, depression, ETOH abuse, migraines who presents via EMS to [facility] ED with cc of migraine. Pt reports that he has migraines every day. States that he is followed by [facility] neurology and they told him that if he has a severe migraine in the future, that cannot be controlled, he should go to the ER. States that he felt a migraine \"ramping up\" for most of the day today. Reports that he took Imitrex and other migraine medication WNR. Does not take any OTC medications anymore \"because they never work. \" Reports that he has \"all the migraine medications\" at home and most of them are ineffective. Is currently receiving outpatient infusions for migraines. States he cannot work because of his migraines. Has nausea and photophobia as associative symptoms. Denies fevers, vomiting, diarrhea, speech/ gait changes, head injuries, cough, congestion, rhinorrhea, sinus pressure, CP, or SOB. No known COVID exposures     Denies tobacco, ETOH use. States that he smoked MJ tonight to see if that would help his pain. PCP: Stella García    There are no other complaints, changes or physical findings at this time. Past Medical History:   Diagnosis Date    Alcohol abuse     Anxiety and depression     Migraine     Seizures ([address]        Past Surgical History:   Procedure Laterality Date    HX ORTHOPAEDIC  2010    hip    HX ORTHOPAEDIC  2019    wrist         History reviewed. No pertinent family history. Social History     Socioeconomic History    Marital status: SINGLE     Spouse name: Not on file    Number of children: Not on file    Years of education: Not on file    Highest education level: Not on file   Occupational History    Not on file   Tobacco Use    Smoking status: Current Some Day Smoker    Smokeless tobacco: Never Used   Substance and Sexual Activity    Alcohol use:  Yes    Drug use: Yes     Types: Marijuana     Comment: \"not in a few months\"  Sexual activity: Not on file   Other Topics Concern    Not on file   Social History Narrative    Not on file     Social Determinants of Health     Financial Resource Strain:     Difficulty of Paying Living Expenses:    Food Insecurity:     Worried About Running Out of Food in the Last Year:     [address] in the Last Year:    Transportation Needs:     Lack of Transportation (Medical):  Lack of Transportation (Non-Medical):    Physical Activity:     Days of Exercise per Week:     Minutes of Exercise per Session:    Stress:     Feeling of Stress :    Social Connections:     Frequency of Communication with Friends and Family:     Frequency of Social Gatherings with Friends and Family:     Attends Yarsanism Services:     Active Member of Clubs or Organizations:     Attends Club or Organization Meetings:     Marital Status:    Intimate Partner Violence:     Fear of Current or Ex-Partner:     Emotionally Abused:     Physically Abused:     Sexually Abused: ALLERGIES: Patient has no known allergies. Review of Systems   Constitutional: Negative for activity change, appetite change, chills and fever. HENT: Negative for congestion, rhinorrhea, sinus pressure and sore throat. Eyes: Positive for photophobia and discharge. Negative for visual disturbance. Respiratory: Negative for cough and shortness of breath. Cardiovascular: Negative for chest pain. Gastrointestinal: Positive for nausea. Negative for abdominal pain, diarrhea and vomiting. Genitourinary: Negative for dysuria, flank pain, frequency and urgency. Musculoskeletal: Negative for arthralgias, back pain, gait problem, joint swelling, myalgias and neck pain. Skin: Negative for color change and rash. Neurological: Positive for headaches. Negative for dizziness, weakness and light-headedness. Psychiatric/Behavioral: Negative for agitation, behavioral problems and confusion.    All other systems reviewed and are negative. Vitals:    10/02/21 2152   BP: 105/75   Pulse: 86   Resp: 16   Temp: 97.5 °F (36.4 °C)   SpO2: 97%            Physical Exam  Vitals and nursing note reviewed. Constitutional:       General: He is not in acute distress. Appearance: He is well-developed. HENT:      Head: Normocephalic and atraumatic. Right Ear: External ear normal.      Left Ear: External ear normal.   Eyes:      Conjunctiva/sclera: Conjunctivae normal.      Pupils: Pupils are equal, round, and reactive to light. Cardiovascular:      Rate and Rhythm: Normal rate and regular rhythm. Heart sounds: Normal heart sounds. Pulmonary:      Effort: Pulmonary effort is normal.      Breath sounds: Normal breath sounds. Abdominal:      Palpations: Abdomen is soft. Musculoskeletal:         General: Normal range of motion. Cervical back: Normal range of motion and neck supple. Skin:     General: Skin is warm and dry. Neurological:      General: No focal deficit present. Mental Status: He is alert and oriented to person, place, and time. Psychiatric:         Behavior: Behavior normal.         Thought Content: Thought content normal.         Judgment: Judgment normal.          MDM  Number of Diagnoses or Management Options  Chronic migraine with aura  Diagnosis management comments: DDx:     Per nursing staff, pt concerned \"I have all of these medications at home and why can't we just go straight to the pain medications. \"     On reassessment, patient states that he is somewhat better. Was sleeping soundly on arrival to room. Advised that it was not common practice to prescribe opiates or stronger pain medications for management of migraines in the ED, given the patient stating that he had a lot of migraine medications at home already. Encouraged him to follow up with neurology ASAP for ongoing evaluation and tx. Pt reports that he believes he has an appt next week.         Amount and/or Complexity of Data Reviewed  Clinical lab tests: ordered and reviewed  Tests in the radiology section of CPT®: ordered and reviewed  Review and summarize past medical records: yes           Procedures      LABORATORY TESTS:  Recent Results (from the past 12 hour(s))   CBC WITH AUTOMATED DIFF    Collection Time: 10/02/21  9:59 PM   Result Value Ref Range    WBC 11.2 (H) 4.1 - 11.1 K/uL    RBC 5.06 4.10 - 5.70 M/uL    HGB 15.0 12.1 - 17.0 g/dL    HCT 42.9 36.6 - 50.3 %    MCV 84.8 80.0 - 99.0 FL    MCH 29.6 26.0 - 34.0 PG    MCHC 35.0 30.0 - 36.5 g/dL    RDW 13.3 11.5 - 14.5 %    PLATELET 867 952 - 038 K/uL    MPV 9.7 8.9 - 12.9 FL    NRBC 0.0 0  WBC    ABSOLUTE NRBC 0.00 0.00 - 0.01 K/uL    NEUTROPHILS 53 32 - 75 %    LYMPHOCYTES 39 12 - 49 %    MONOCYTES 5 5 - 13 %    EOSINOPHILS 2 0 - 7 %    BASOPHILS 1 0 - 1 %    IMMATURE GRANULOCYTES 0 0.0 - 0.5 %    ABS. NEUTROPHILS 6.0 1.8 - 8.0 K/UL    ABS. LYMPHOCYTES 4.3 (H) 0.8 - 3.5 K/UL    ABS. MONOCYTES 0.6 0.0 - 1.0 K/UL    ABS. EOSINOPHILS 0.2 0.0 - 0.4 K/UL    ABS. BASOPHILS 0.1 0.0 - 0.1 K/UL    ABS. IMM. GRANS. 0.0 0.00 - 0.04 K/UL    DF AUTOMATED     METABOLIC PANEL, COMPREHENSIVE    Collection Time: 10/02/21  9:59 PM   Result Value Ref Range    Sodium 137 136 - 145 mmol/L    Potassium 3.7 3.5 - 5.1 mmol/L    Chloride 105 97 - 108 mmol/L    CO2 27 21 - 32 mmol/L    Anion gap 5 5 - 15 mmol/L    Glucose 105 (H) 65 - 100 mg/dL    BUN 12 6 - 20 MG/DL    Creatinine 1.00 0.70 - 1.30 MG/DL    BUN/Creatinine ratio 12 12 - 20      GFR est AA >60 >60 ml/min/1.73m2    GFR est non-AA >60 >60 ml/min/1.73m2    Calcium 9.1 8.5 - 10.1 MG/DL    Bilirubin, total 0.2 0.2 - 1.0 MG/DL    ALT (SGPT) 24 12 - 78 U/L    AST (SGOT) 12 (L) 15 - 37 U/L    Alk.  phosphatase 82 45 - 117 U/L    Protein, total 7.6 6.4 - 8.2 g/dL    Albumin 3.7 3.5 - 5.0 g/dL    Globulin 3.9 2.0 - 4.0 g/dL    A-G Ratio 0.9 (L) 1.1 - 2.2     SED RATE (ESR)    Collection Time: 10/02/21  9:59 PM   Result Value Ref Range    Sed rate, automated 4 0 - 15 mm/hr   C REACTIVE PROTEIN, QT    Collection Time: 10/02/21  9:59 PM   Result Value Ref Range    C-Reactive protein <0.29 0.00 - 0.60 mg/dL   ETHYL ALCOHOL    Collection Time: 10/02/21  9:59 PM   Result Value Ref Range    ALCOHOL(ETHYL),SERUM <10 <10 MG/DL   SAMPLES BEING HELD    Collection Time: 10/02/21  9:59 PM   Result Value Ref Range    SAMPLES BEING HELD 1BLU     COMMENT        Add-on orders for these samples will be processed based on acceptable specimen integrity and analyte stability, which may vary by analyte. DRUG SCREEN, URINE    Collection Time: 10/02/21 10:47 PM   Result Value Ref Range    AMPHETAMINES Negative NEG      BARBITURATES Positive (A) NEG      BENZODIAZEPINES Positive (A) NEG      COCAINE Negative NEG      METHADONE Negative NEG      OPIATES Negative NEG      PCP(PHENCYCLIDINE) Negative NEG      THC (TH-CANNABINOL) Positive (A) NEG      Drug screen comment (NOTE)        IMAGING RESULTS:  CT HEAD WO CONT   Final Result         No acute abnormality          MEDICATIONS GIVEN:  Medications   sodium chloride 0.9 % bolus infusion 1,000 mL (0 mL IntraVENous IV Completed 10/2/21 2354)   dexamethasone (PF) (DECADRON) 10 mg/mL injection 10 mg (10 mg IntraVENous Given 10/2/21 2242)   ketorolac (TORADOL) injection 30 mg (30 mg IntraVENous Given 10/2/21 2245)   prochlorperazine (COMPAZINE) with saline injection 10 mg (10 mg IntraVENous Given 10/2/21 2243)   butalbital-acetaminophen-caffeine (FIORICET, ESGIC) -40 mg per tablet 2 Tablet (2 Tablets Oral Given 10/2/21 2246)   diphenhydrAMINE (BENADRYL) injection 25 mg (25 mg IntraVENous Given 10/2/21 2240)       IMPRESSION:  1. Chronic migraine with aura        PLAN:  1. Discharge Medication List as of 10/2/2021 11:45 PM      START taking these medications    Details   predniSONE (DELTASONE) 50 mg tablet Take 1 Tablet by mouth daily for 3 days. , Print, Disp-3 Tablet, R-0      promethazine (PHENERGAN) 25 mg suppository Insert 1 Suppository into rectum every six (6) hours as needed for Nausea for up to 7 days. , Print, Disp-12 Suppository, R-0         CONTINUE these medications which have NOT CHANGED    Details   zonisamide (ZONEGRAN) 50 mg capsule Take 1 Capsule by mouth two (2) times a day., Normal, Disp-60 Capsule, R-2      ZOLMitriptan (ZOMIG) 5 mg tablet 1 at HA onset and repeat in 2 hours if needed. Max 2 in 24 hours, Normal, Disp-9 Tablet, R-0      diclofenac EC (VOLTAREN) 75 mg EC tablet Take 1 tablet by mouth BID PRN for headache, Normal, Disp-60 Tablet, R-0      gabapentin (NEURONTIN) 800 mg tablet Take 800 mg by mouth three (3) times daily. Occasionally takes BID, but usually TID, Historical Med      Lisdexamfetamine (Vyvanse) 40 mg capsule Take 40 mg by mouth every morning., Historical Med      rizatriptan (Maxalt-MLT) 10 mg disintegrating tablet 1 tab at onset of migraine; repeat 1 tab in 2 hours if HA remains; Limit: 2 tabs in 24 hours, max 3 days/ week. 30 Day Rx, Normal, Disp-9 Tablet, R-2      onabotulinumtoxinA (Botox) 200 unit injection Inject 155 units into 31 FDA indicated and sites in the head, face, neck every 3 months for chronic migraine, Normal, Disp-1 Vial, R-5      guanFACINE ER (INTUNIV) 2 mg ER tablet Take 2 mg by mouth daily. , Historical Med      mirtazapine (REMERON) 30 mg tablet Take 30 mg by mouth nightly., Historical Med      polyethylene glycol (MIRALAX) 17 gram packet Take 1 Packet by mouth two (2) times a day., Print, Disp-60 Packet, R-0      naloxone (Narcan) 4 mg/actuation nasal spray Use 1 spray intranasally, then discard. Repeat with new spray every 2 min as needed for opioid overdose symptoms, alternating nostrils. , Print, Disp-1 Each, R-0      erenumab-aooe (Aimovig Autoinjector) 140 mg/mL injection 1 mL by SubCUTAneous route every thirty (30) days. , Normal, Disp-1 Each, R-5      SUMAtriptan (IMITREX) 100 mg tablet 1 at HA onset and repeat in 2 hours if needed.   Max 2 in 24 hours, Normal, Disp-15 Tablet, R-5      Desvenlafaxine 100 mg Tb24 Take 100 mg by mouth daily. , Historical Med           2. Follow-up Information     Follow up With Specialties Details Why Contact Info    [address] [facility] Emergency Medicine Go to  As needed, If symptoms worsen [address]    Sravanthi Smalls NP Nurse Practitioner Schedule an appointment as soon as possible for a visit   [address]  [facility] [address] [id number] [phone]          3.  Return to ED if worse

## 2021-10-03 NOTE — ED TRIAGE NOTES
Patient arrives via EMS from home with CC of a migraine x1 hour. Pt states it feels like his usual migraines. Described as Sharp stabbing pain in head. Took Imitrex and smoked Marijuana prior to arrival with no relief.

## 2021-10-04 ENCOUNTER — HOSPITAL ENCOUNTER (OUTPATIENT)
Dept: INFUSION THERAPY | Age: 31
Discharge: HOME | End: 2021-10-04
Payer: MEDICAID

## 2021-10-04 ENCOUNTER — TELEPHONE (OUTPATIENT)
Dept: NEUROLOGY | Age: 31
End: 2021-10-04

## 2021-10-04 VITALS
HEART RATE: 75 BPM | TEMPERATURE: 98.9 F | RESPIRATION RATE: 16 BRPM | SYSTOLIC BLOOD PRESSURE: 120 MMHG | DIASTOLIC BLOOD PRESSURE: 59 MMHG | OXYGEN SATURATION: 98 %

## 2021-10-04 DIAGNOSIS — H54.7 VISUAL LOSS: ICD-10-CM

## 2021-10-04 DIAGNOSIS — R51.9 WORST HEADACHE OF LIFE: Primary | ICD-10-CM

## 2021-10-04 DIAGNOSIS — R41.3 MEMORY LOSS: ICD-10-CM

## 2021-10-04 PROCEDURE — 96365 THER/PROPH/DIAG IV INF INIT: CPT

## 2021-10-04 PROCEDURE — 74011000258 HC RX REV CODE- 258: Performed by: NURSE PRACTITIONER

## 2021-10-04 PROCEDURE — 74011250636 HC RX REV CODE- 250/636: Performed by: NURSE PRACTITIONER

## 2021-10-04 PROCEDURE — 74011000250 HC RX REV CODE- 250: Performed by: NURSE PRACTITIONER

## 2021-10-04 PROCEDURE — 96375 TX/PRO/DX INJ NEW DRUG ADDON: CPT

## 2021-10-04 RX ORDER — EPTINEZUMAB-JJMR 100 MG/ML
300 INJECTION INTRAVENOUS
Qty: 3 ML | Refills: 5 | Status: SHIPPED | OUTPATIENT
Start: 2021-10-04 | End: 2021-12-01 | Stop reason: ALTCHOICE

## 2021-10-04 RX ORDER — DEXAMETHASONE SODIUM PHOSPHATE 100 MG/10ML
10 INJECTION INTRAMUSCULAR; INTRAVENOUS ONCE
Status: COMPLETED | OUTPATIENT
Start: 2021-10-04 | End: 2021-10-04

## 2021-10-04 RX ORDER — DIPHENHYDRAMINE HYDROCHLORIDE 50 MG/ML
25 INJECTION, SOLUTION INTRAMUSCULAR; INTRAVENOUS ONCE
Status: COMPLETED | OUTPATIENT
Start: 2021-10-04 | End: 2021-10-04

## 2021-10-04 RX ORDER — SODIUM CHLORIDE 9 MG/ML
25 INJECTION, SOLUTION INTRAVENOUS ONCE
Status: COMPLETED | OUTPATIENT
Start: 2021-10-04 | End: 2021-10-04

## 2021-10-04 RX ORDER — METOCLOPRAMIDE HYDROCHLORIDE 5 MG/ML
10 INJECTION INTRAMUSCULAR; INTRAVENOUS ONCE
Status: COMPLETED | OUTPATIENT
Start: 2021-10-04 | End: 2021-10-04

## 2021-10-04 RX ADMIN — METOCLOPRAMIDE HYDROCHLORIDE 10 MG: 5 INJECTION INTRAMUSCULAR; INTRAVENOUS at 14:35

## 2021-10-04 RX ADMIN — DIHYDROERGOTAMINE MESYLATE 1 MG: 1 INJECTION, SOLUTION INTRAMUSCULAR; INTRAVENOUS; SUBCUTANEOUS at 15:17

## 2021-10-04 RX ADMIN — DIPHENHYDRAMINE HYDROCHLORIDE 25 MG: 50 INJECTION INTRAMUSCULAR; INTRAVENOUS at 10:00

## 2021-10-04 RX ADMIN — SODIUM CHLORIDE 25 ML/HR: 9 INJECTION, SOLUTION INTRAVENOUS at 14:24

## 2021-10-04 RX ADMIN — DEXAMETHASONE SODIUM PHOSPHATE 10 MG: 10 INJECTION INTRAMUSCULAR; INTRAVENOUS at 14:30

## 2021-10-04 RX ADMIN — SODIUM CHLORIDE 500 MG: 900 INJECTION, SOLUTION INTRAVENOUS at 15:37

## 2021-10-04 NOTE — PROGRESS NOTES
Pt arrived ambulatory and in no distress for migraine treatment. Assessment completed. Pt reports migraine, currently rated at 7 on 10 scale. No other complaints voiced at this time. IV established without difficulty. Medications:  Medications Administered     0.9% sodium chloride infusion     Admin Date  10/04/2021 Action  New Bag Dose  25 mL/hr Rate  25 mL/hr Route  IntraVENous Administered By  Kymberly Law RN          dexamethasone (DECADRON) 10 mg/mL injection 10 mg     Admin Date  10/04/2021 Action  Given Dose  10 mg Route  IntraVENous Administered By  Kymberly Law RN          dihydroergotamine (DHE-45) 1 mg in 0.9% sodium chloride 50 mL, overfill volume 5 mL IVPB     Admin Date  10/04/2021 Action  New Bag Dose  1 mg Route  IntraVENous Administered By  Kymberly Law RN          diphenhydrAMINE (BENADRYL) injection 25 mg     Admin Date  10/04/2021 Action  Given Dose  25 mg Route  IntraVENous Administered By  Kymberly Law RN          metoclopramide HCl (REGLAN) injection 10 mg     Admin Date  10/04/2021 Action  Given Dose  10 mg Route  IntraVENous Administered By  Kymberly Law RN          valproate (DEPACON) 500 mg in 0.9% sodium chloride 50 mL, overfill volume 5 mL IVPB     Admin Date  10/04/2021 Action  New Bag Dose  500 mg Rate  60 mL/hr Route  IntraVENous Administered By  Kymberly Law RN                    Tolerated treatment well, no adverse reactions noted. PIV flushed and discontinued per protocol. D/Cd from [facility] ambulatory and in no distress. No appointments scheduled at this time, per communication with Alexander DURANT the patient is to complete a Brain MRI and [facility] He also ordered an infusion of Vyeti which will be done at an outside infusion center.

## 2021-10-04 NOTE — TELEPHONE ENCOUNTER
----- Message from Jackie Ann sent at 10/4/2021 10:40 AM EDT -----  Regarding: NP. Chana/telephone  General Message/Vendor Calls    Caller's first and last name: Gabi Montelongo (Pt's mother)      Reason for call: Pt's called to speak with NP. Chana      Callback required yes/no and why: yes to speak with NP. Chana      Best contact number(s):[phone]      Details to clarify the request:Pt's mother called to speak with NP. Chana, regarding on scheduling an appointment. Pt went to the ER.       Jackie Ann

## 2021-10-08 ENCOUNTER — TELEPHONE (OUTPATIENT)
Dept: NEUROLOGY | Age: 31
End: 2021-10-08

## 2021-10-12 ENCOUNTER — TELEPHONE (OUTPATIENT)
Dept: NEUROLOGY | Age: 31
End: 2021-10-12

## 2021-10-12 NOTE — TELEPHONE ENCOUNTER
Spoke with mother and she is very frustrated. Patient just lays around in pain and does not speak, etc.  She has not heard from [facility]/ Dr Yeyo Hutson. The MRI was not done because a P2P was not done. They have not heard from the [facility] people yet. She would like to try the [place]. Requests that we try to arrange a P2P with insurance re: MRI and try to contact [facility] to see about the referral/ appointment.

## 2021-10-12 NOTE — TELEPHONE ENCOUNTER
----- Message from [employer] sent at 10/11/2021  4:41 PM EDT -----  Regarding: Avelina Veronica NP  General Message/Vendor Calls    Caller's first and last name: Bernardo Ramírez ( Mom)      Reason for call:  Mother Called in stating needs to talk  to NP Raynaldo Canavan her son been having headaches everyday since his 2nd round of infusions       Callback required yes/no and why:yes      Best contact number(s):[phone]      Details to clarify the request:requesting a call back       [facility].

## 2021-10-12 NOTE — TELEPHONE ENCOUNTER
Have they heard about Vyepti yet? Any word from [facility]? MRI brain? We can try Merrick or Diamoxx if they want.

## 2021-10-12 NOTE — TELEPHONE ENCOUNTER
Called [facility]/ Dr Myra Moore office @ [phone], spoke with Mar Berg. She will send message to clinical staff to see if the got the referral/ records & where they are in the scheduling process.

## 2021-10-13 NOTE — TELEPHONE ENCOUNTER
Spoke with [employer] and gave her the P2P information and auth number. She will reach out to the patient and schedule the MRI. Asked her to speak with mother, Mathew Morales, when scheduling and gave her the cell number.

## 2021-10-13 NOTE — TELEPHONE ENCOUNTER
[id number]   is the approval number for the MRI brain     Can you get that to whoever needs that to schedule him   The P2P was approved. Gave you the qulipta also  Thanks!

## 2021-10-18 DIAGNOSIS — G43.809 OTHER MIGRAINE WITHOUT STATUS MIGRAINOSUS, NOT INTRACTABLE: ICD-10-CM

## 2021-10-18 NOTE — TELEPHONE ENCOUNTER
Spoke with [facility] scheduling, and she will call mother now to schedule the MRI Brain.

## 2021-10-18 NOTE — TELEPHONE ENCOUNTER
----- Message from [facility] sent at 10/18/2021  8:36 AM EDT -----  Regarding: / telephone  Contact: [phone]  General Message/Vendor Calls    Caller's first and last name: Trae Verma       Reason for call: Patient received new daily prescription and company has to call and mail it to patient and still has not got information back on medications.       Callback required yes/no and why: yes, discuss       Best contact number(s): [phone]      Details to clarify the request: [address] [place] [address], [address]

## 2021-10-19 NOTE — TELEPHONE ENCOUNTER
Spoke with Dr Sudhakar Marcial office. Wali Ramirez states patient was loaded into their system as a general patient, and Dr Arnoldo Luis does not see general patient's. Explained we sent a referral that the patient was to see Dr Arnoldo Luis specifically for severe headaches. She  Pulled up the referral and agreed the instructions were clear. Also states the mother keeps calling for an appointment. Informed her mother is very concerned patient cannot get an appointment. Patient is in a lot of pain. Wali Ramirez will reach out to the clinical team to get this straightened out.

## 2021-10-20 NOTE — TELEPHONE ENCOUNTER
Called and spoke with the [facility] headache  (sounds like his name is Lucila Rodriguez). States he has called mother x 2 last week and was not able to contact her. Asked abour mother reportedly calling to make the appointment, and that is true. Lucila Rodriguez will try calling the mother right now. Confirmed her fax number.

## 2021-10-20 NOTE — TELEPHONE ENCOUNTER
Spoke at length with mother. Dr Angie Aguiar office just called and cannot get patient in until 5/22/21. She is distraught. They are friends with Dr Duran Reynaga, and he had suggested Dr Marcial Vincent @ [facility]. Or maybe consider [facility]. Mother will call and see if they can get patient in with Dr Marcial Vincent. She would like a referral to Dr Marcial Vincent sent in. She is also asking about accupuncture- would this be helpful? MRI Brain is sched 10/26/21. Mother will call [facility] to see about the program for free medication. Patient is now on Medicaid due to being out of work. Saw PCP last week to deal with depression. Follow up with [facility] sched 12/1/21. Wants the MyChart closed as patient is locked out and just cannot deal with restart right now.

## 2021-10-21 NOTE — TELEPHONE ENCOUNTER
Sure.   He can try whatever he wants. They do not need a referral for DR. Collette Harrison cna just call

## 2021-10-22 RX ORDER — SUMATRIPTAN 100 MG/1
TABLET, FILM COATED ORAL
Qty: 15 TABLET | Refills: 5 | Status: SHIPPED | OUTPATIENT
Start: 2021-10-22 | End: 2021-11-01 | Stop reason: SDUPTHER

## 2021-10-25 ENCOUNTER — TELEPHONE (OUTPATIENT)
Dept: NEUROLOGY | Age: 31
End: 2021-10-25

## 2021-10-26 ENCOUNTER — HOSPITAL ENCOUNTER (OUTPATIENT)
Dept: MRI IMAGING | Age: 31
Discharge: HOME | End: 2021-10-26
Attending: NURSE PRACTITIONER
Payer: MEDICAID

## 2021-10-26 VITALS — BODY MASS INDEX: 27.89 KG/M2 | WEIGHT: 200 LBS

## 2021-10-26 DIAGNOSIS — R51.9 WORST HEADACHE OF LIFE: ICD-10-CM

## 2021-10-26 DIAGNOSIS — H54.7 VISUAL LOSS: ICD-10-CM

## 2021-10-26 DIAGNOSIS — R41.3 MEMORY LOSS: ICD-10-CM

## 2021-10-26 PROCEDURE — 74011250636 HC RX REV CODE- 250/636: Performed by: NURSE PRACTITIONER

## 2021-10-26 PROCEDURE — A9576 INJ PROHANCE MULTIPACK: HCPCS | Performed by: NURSE PRACTITIONER

## 2021-10-26 PROCEDURE — 70553 MRI BRAIN STEM W/O & W/DYE: CPT

## 2021-10-26 RX ADMIN — GADOTERIDOL 18 ML: 279.3 INJECTION, SOLUTION INTRAVENOUS at 10:36

## 2021-11-01 DIAGNOSIS — G43.809 OTHER MIGRAINE WITHOUT STATUS MIGRAINOSUS, NOT INTRACTABLE: ICD-10-CM

## 2021-11-01 RX ORDER — SUMATRIPTAN 100 MG/1
TABLET, FILM COATED ORAL
Qty: 15 TABLET | Refills: 5 | Status: SHIPPED | OUTPATIENT
Start: 2021-11-01 | End: 2022-03-15

## 2021-11-23 DIAGNOSIS — G43.919 INTRACTABLE MIGRAINE WITHOUT STATUS MIGRAINOSUS, UNSPECIFIED MIGRAINE TYPE: ICD-10-CM

## 2021-11-24 RX ORDER — UBROGEPANT 100 MG/1
TABLET ORAL
Qty: 10 TABLET | Refills: 5 | Status: SHIPPED | OUTPATIENT
Start: 2021-11-24

## 2021-12-01 ENCOUNTER — OFFICE VISIT (OUTPATIENT)
Dept: NEUROLOGY | Age: 31
End: 2021-12-01
Payer: MEDICAID

## 2021-12-01 VITALS
TEMPERATURE: 97.4 F | DIASTOLIC BLOOD PRESSURE: 72 MMHG | OXYGEN SATURATION: 97 % | HEART RATE: 116 BPM | SYSTOLIC BLOOD PRESSURE: 116 MMHG

## 2021-12-01 DIAGNOSIS — G43.919 INTRACTABLE MIGRAINE WITHOUT STATUS MIGRAINOSUS, UNSPECIFIED MIGRAINE TYPE: Primary | ICD-10-CM

## 2021-12-01 PROCEDURE — 99214 OFFICE O/P EST MOD 30 MIN: CPT | Performed by: NURSE PRACTITIONER

## 2021-12-01 RX ORDER — HYDROXYZINE PAMOATE 100 MG/1
100 CAPSULE ORAL
COMMUNITY

## 2021-12-01 RX ORDER — SUMATRIPTAN 20 MG/1
SPRAY NASAL
Qty: 6 EACH | Refills: 5 | Status: SHIPPED | OUTPATIENT
Start: 2021-12-01

## 2021-12-01 RX ORDER — BUTALBITAL, ACETAMINOPHEN AND CAFFEINE 50; 325; 40 MG/1; MG/1; MG/1
TABLET ORAL
Qty: 60 TABLET | Refills: 4 | Status: SHIPPED | OUTPATIENT
Start: 2021-12-01 | End: 2022-04-04

## 2021-12-02 NOTE — PROGRESS NOTES
Irina Wheatley is a 31 y.o. male who presents with the following  Chief Complaint   Patient presents with    Migraine       HPI       FU chronic, intractable migraine. Still debilitated day to day   He is unable to really do anything. He is having chronic head pain, 10-10. When he needs, imitrex works. Has failed other triptans, nurtec, ubrelvy. Has failed Aimovig, BOtox X 1 round, zonegran, depakote, pamelor. Debilitated, sharp, stabbing pains. Light, sound, smell sensitive. Nothing is really helping. imitrex can dull. Waking up with really bad ones. Sleeps a lot. Can not function. No Known Allergies    Current Outpatient Medications   Medication Sig    hydrOXYzine pamoate (VISTARIL) 100 mg capsule Take 100 mg by mouth nightly as needed.  SUMAtriptan (IMITREX) 20 mg/actuation nasal spray 1 spray in 1 nostril at HA onset. May repeat X 1 dose in 2 hours. Max 2 doses in 24 hours.  butalbital-acetaminophen-caffeine (FIORICET, ESGIC) -40 mg per tablet Take 1-2 tablets by mouth every 8 hours PRN for headache    Ubrelvy 100 mg tablet TAKE 1 TABLET BY MOUTH AT ONSET OF HEADACHE. REPEAT AFTER 2 HOURS IF NEEDED. MAX 2 IN 24 HOURS    SUMAtriptan (IMITREX) 100 mg tablet 1 at HA onset and repeat in 2 hours if needed. Max 2 in 24 hours    guanFACINE ER (INTUNIV) 2 mg ER tablet Take 2 mg by mouth daily.  polyethylene glycol (MIRALAX) 17 gram packet Take 1 Packet by mouth two (2) times a day.  naloxone (Narcan) 4 mg/actuation nasal spray Use 1 spray intranasally, then discard. Repeat with new spray every 2 min as needed for opioid overdose symptoms, alternating nostrils.  Desvenlafaxine 100 mg Tb24 Take 100 mg by mouth daily. No current facility-administered medications for this visit.        Social History     Tobacco Use   Smoking Status Current Some Day Smoker   Smokeless Tobacco Never Used       Past Medical History:   Diagnosis Date    Alcohol abuse     Anxiety and depression     Migraine     Seizures ([facility] [address]        Past Surgical History:   Procedure Laterality Date    HX ORTHOPAEDIC  2010    hip    HX ORTHOPAEDIC  2019    wrist       No family history on file. Social History     Socioeconomic History    Marital status: SINGLE   Tobacco Use    Smoking status: Current Some Day Smoker    Smokeless tobacco: Never Used   Substance and Sexual Activity    Alcohol use: Yes    Drug use: Yes     Types: Marijuana     Comment: \"not in a few months\"       Review of Systems   Constitutional: Positive for malaise/fatigue. Eyes: Positive for blurred vision, double vision and photophobia. Respiratory: Negative for shortness of breath and wheezing. Cardiovascular: Negative for chest pain and palpitations. Gastrointestinal: Positive for nausea and vomiting. Neurological: Positive for dizziness, tingling, sensory change, weakness and headaches. Psychiatric/Behavioral: Positive for memory loss. The patient is nervous/anxious and has insomnia. Remainder of comprehensive review is negative. Physical Exam :    Visit Vitals  /72   Pulse (!) 116   Temp 97.4 °F (36.3 °C)   SpO2 97%         Results for orders placed or performed during the hospital encounter of 10/02/21   CBC WITH AUTOMATED DIFF   Result Value Ref Range    WBC 11.2 (H) 4.1 - 11.1 K/uL    RBC 5.06 4.10 - 5.70 M/uL    HGB 15.0 12.1 - 17.0 g/dL    HCT 42.9 36.6 - 50.3 %    MCV 84.8 80.0 - 99.0 FL    MCH 29.6 26.0 - 34.0 PG    MCHC 35.0 30.0 - 36.5 g/dL    RDW 13.3 11.5 - 14.5 %    PLATELET 060 636 - 365 K/uL    MPV 9.7 8.9 - 12.9 FL    NRBC 0.0 0  WBC    ABSOLUTE NRBC 0.00 0.00 - 0.01 K/uL    NEUTROPHILS 53 32 - 75 %    LYMPHOCYTES 39 12 - 49 %    MONOCYTES 5 5 - 13 %    EOSINOPHILS 2 0 - 7 %    BASOPHILS 1 0 - 1 %    IMMATURE GRANULOCYTES 0 0.0 - 0.5 %    ABS. NEUTROPHILS 6.0 1.8 - 8.0 K/UL    ABS. LYMPHOCYTES 4.3 (H) 0.8 - 3.5 K/UL    ABS. MONOCYTES 0.6 0.0 - 1.0 K/UL    ABS. EOSINOPHILS 0.2 0.0 - 0.4 K/UL    ABS. BASOPHILS 0.1 0.0 - 0.1 K/UL    ABS. IMM. GRANS. 0.0 0.00 - 0.04 K/UL    DF AUTOMATED     METABOLIC PANEL, COMPREHENSIVE   Result Value Ref Range    Sodium 137 136 - 145 mmol/L    Potassium 3.7 3.5 - 5.1 mmol/L    Chloride 105 97 - 108 mmol/L    CO2 27 21 - 32 mmol/L    Anion gap 5 5 - 15 mmol/L    Glucose 105 (H) 65 - 100 mg/dL    BUN 12 6 - 20 MG/DL    Creatinine 1.00 0.70 - 1.30 MG/DL    BUN/Creatinine ratio 12 12 - 20      GFR est AA >60 >60 ml/min/1.73m2    GFR est non-AA >60 >60 ml/min/1.73m2    Calcium 9.1 8.5 - 10.1 MG/DL    Bilirubin, total 0.2 0.2 - 1.0 MG/DL    ALT (SGPT) 24 12 - 78 U/L    AST (SGOT) 12 (L) 15 - 37 U/L    Alk. phosphatase 82 45 - 117 U/L    Protein, total 7.6 6.4 - 8.2 g/dL    Albumin 3.7 3.5 - 5.0 g/dL    Globulin 3.9 2.0 - 4.0 g/dL    A-G Ratio 0.9 (L) 1.1 - 2.2     SED RATE (ESR)   Result Value Ref Range    Sed rate, automated 4 0 - 15 mm/hr   C REACTIVE PROTEIN, QT   Result Value Ref Range    C-Reactive protein <0.29 0.00 - 0.60 mg/dL   DRUG SCREEN, URINE   Result Value Ref Range    AMPHETAMINES Negative NEG      BARBITURATES Positive (A) NEG      BENZODIAZEPINES Positive (A) NEG      COCAINE Negative NEG      METHADONE Negative NEG      OPIATES Negative NEG      PCP(PHENCYCLIDINE) Negative NEG      THC (TH-CANNABINOL) Positive (A) NEG      Drug screen comment (NOTE)    ETHYL ALCOHOL   Result Value Ref Range    ALCOHOL(ETHYL),SERUM <10 <10 MG/DL   SAMPLES BEING HELD   Result Value Ref Range    SAMPLES BEING HELD 1BLU     COMMENT        Add-on orders for these samples will be processed based on acceptable specimen integrity and analyte stability, which may vary by analyte. Orders Placed This Encounter    hydrOXYzine pamoate (VISTARIL) 100 mg capsule     Sig: Take 100 mg by mouth nightly as needed.  SUMAtriptan (IMITREX) 20 mg/actuation nasal spray     Si spray in 1 nostril at HA onset. May repeat X 1 dose in 2 hours.  Max 2 doses in 24 hours. Dispense:  6 Each     Refill:  5    butalbital-acetaminophen-caffeine (FIORICET, ESGIC) -40 mg per tablet     Sig: Take 1-2 tablets by mouth every 8 hours PRN for headache     Dispense:  60 Tablet     Refill:  4       No diagnosis found. . Chronic migraine     Intractable migriane. Will call to get Vyepti on board  Has an appointment with Dr. Karis Diaz in March we will try to get moved up   Has failed significant prevention, breaking cycles. Has had MRI, blood, CT and nothing structural   Went to a pain specialist for ketamine and this did not help pain but did help mood  Imitrex is the only thing that helps HA rescue. Keep this and can try nasal spray to see if this helps better   He is still debilitated.              This note will not be viewable in [employer]

## 2021-12-27 ENCOUNTER — TELEPHONE (OUTPATIENT)
Dept: NEUROLOGY | Age: 31
End: 2021-12-27

## 2022-01-06 DIAGNOSIS — G43.919 INTRACTABLE MIGRAINE WITHOUT STATUS MIGRAINOSUS, UNSPECIFIED MIGRAINE TYPE: ICD-10-CM

## 2022-01-07 RX ORDER — ERENUMAB-AOOE 140 MG/ML
INJECTION, SOLUTION SUBCUTANEOUS
Qty: 1 ML | Refills: 5 | Status: SHIPPED | OUTPATIENT
Start: 2022-01-07

## 2022-01-14 ENCOUNTER — TELEPHONE (OUTPATIENT)
Dept: NEUROLOGY | Age: 32
End: 2022-01-14

## 2022-03-15 DIAGNOSIS — G43.809 OTHER MIGRAINE WITHOUT STATUS MIGRAINOSUS, NOT INTRACTABLE: ICD-10-CM

## 2022-03-15 RX ORDER — SUMATRIPTAN 100 MG/1
TABLET, FILM COATED ORAL
Qty: 15 TABLET | Refills: 5 | Status: SHIPPED | OUTPATIENT
Start: 2022-03-15 | End: 2022-06-27

## 2022-03-19 PROBLEM — R74.01 TRANSAMINITIS: Status: ACTIVE | Noted: 2021-07-19

## 2022-03-19 PROBLEM — R10.9 ABDOMINAL PAIN: Status: ACTIVE | Noted: 2021-07-21

## 2022-03-19 PROBLEM — K85.90 ACUTE PANCREATITIS: Status: ACTIVE | Noted: 2021-07-19

## 2022-04-04 RX ORDER — BUTALBITAL, ACETAMINOPHEN AND CAFFEINE 50; 325; 40 MG/1; MG/1; MG/1
TABLET ORAL
Qty: 60 TABLET | Refills: 4 | Status: SHIPPED | OUTPATIENT
Start: 2022-04-04 | End: 2022-06-29

## 2022-06-24 DIAGNOSIS — G43.809 OTHER MIGRAINE WITHOUT STATUS MIGRAINOSUS, NOT INTRACTABLE: ICD-10-CM

## 2022-06-27 RX ORDER — SUMATRIPTAN 100 MG/1
TABLET, FILM COATED ORAL
Qty: 15 TABLET | Refills: 5 | Status: SHIPPED | OUTPATIENT
Start: 2022-06-27

## 2022-06-29 RX ORDER — BUTALBITAL, ACETAMINOPHEN AND CAFFEINE 50; 325; 40 MG/1; MG/1; MG/1
TABLET ORAL
Qty: 60 TABLET | Refills: 4 | Status: SHIPPED | OUTPATIENT
Start: 2022-06-29 | End: 2022-08-29

## 2022-08-29 RX ORDER — BUTALBITAL, ACETAMINOPHEN AND CAFFEINE 50; 325; 40 MG/1; MG/1; MG/1
TABLET ORAL
Qty: 60 TABLET | Refills: 4 | Status: SHIPPED | OUTPATIENT
Start: 2022-08-29 | End: 2022-10-21

## 2022-10-21 RX ORDER — BUTALBITAL, ACETAMINOPHEN AND CAFFEINE 50; 325; 40 MG/1; MG/1; MG/1
TABLET ORAL
Qty: 60 TABLET | Refills: 4 | Status: SHIPPED | OUTPATIENT
Start: 2022-10-21

## 2022-11-30 DIAGNOSIS — G43.809 OTHER MIGRAINE WITHOUT STATUS MIGRAINOSUS, NOT INTRACTABLE: ICD-10-CM

## 2022-11-30 RX ORDER — SUMATRIPTAN 100 MG/1
TABLET, FILM COATED ORAL
Qty: 15 TABLET | Refills: 5 | Status: SHIPPED | OUTPATIENT
Start: 2022-11-30

## 2023-01-13 RX ORDER — BUTALBITAL, ACETAMINOPHEN AND CAFFEINE 50; 325; 40 MG/1; MG/1; MG/1
TABLET ORAL
Qty: 60 TABLET | Refills: 4 | Status: SHIPPED | OUTPATIENT
Start: 2023-01-13

## 2023-04-12 RX ORDER — SUMATRIPTAN 20 MG/1
SPRAY NASAL
Qty: 6 EACH | Refills: 5 | Status: CANCELLED | OUTPATIENT
Start: 2023-04-12

## 2023-05-04 DIAGNOSIS — G43.809 OTHER MIGRAINE WITHOUT STATUS MIGRAINOSUS, NOT INTRACTABLE: ICD-10-CM

## 2023-05-04 RX ORDER — SUMATRIPTAN 100 MG/1
TABLET, FILM COATED ORAL
Qty: 15 TABLET | Refills: 5 | Status: SHIPPED | OUTPATIENT
Start: 2023-05-04

## 2023-07-26 DIAGNOSIS — G43.809 OTHER MIGRAINE, NOT INTRACTABLE, WITHOUT STATUS MIGRAINOSUS: ICD-10-CM

## 2023-07-26 RX ORDER — SUMATRIPTAN 100 MG/1
TABLET, FILM COATED ORAL
Qty: 15 TABLET | Refills: 5 | OUTPATIENT
Start: 2023-07-26

## 2023-08-24 ENCOUNTER — TRANSCRIBE ORDERS (OUTPATIENT)
Facility: HOSPITAL | Age: 33
End: 2023-08-24

## 2023-08-24 DIAGNOSIS — G43.709 CHRONIC MIGRAINE WITHOUT AURA WITHOUT STATUS MIGRAINOSUS, NOT INTRACTABLE: Primary | ICD-10-CM

## 2023-09-08 PROBLEM — G43.909 MIGRAINE: Status: ACTIVE | Noted: 2023-09-08

## 2023-09-13 ENCOUNTER — HOSPITAL ENCOUNTER (OUTPATIENT)
Facility: HOSPITAL | Age: 33
Discharge: HOME | End: 2023-09-16
Payer: MEDICAID

## 2023-09-13 DIAGNOSIS — G43.709 CHRONIC MIGRAINE WITHOUT AURA WITHOUT STATUS MIGRAINOSUS, NOT INTRACTABLE: ICD-10-CM

## 2023-09-13 PROCEDURE — 70544 MR ANGIOGRAPHY HEAD W/O DYE: CPT

## 2023-09-14 RX ORDER — SODIUM CHLORIDE 9 MG/ML
INJECTION, SOLUTION INTRAVENOUS CONTINUOUS
Status: CANCELLED | OUTPATIENT
Start: 2023-09-20

## 2023-09-20 ENCOUNTER — HOSPITAL ENCOUNTER (OUTPATIENT)
Facility: HOSPITAL | Age: 33
Setting detail: INFUSION SERIES
End: 2023-09-20
Payer: MEDICAID

## 2023-09-20 VITALS
SYSTOLIC BLOOD PRESSURE: 105 MMHG | HEIGHT: 71 IN | DIASTOLIC BLOOD PRESSURE: 65 MMHG | RESPIRATION RATE: 18 BRPM | TEMPERATURE: 97.8 F | OXYGEN SATURATION: 97 % | HEART RATE: 69 BPM | WEIGHT: 195.8 LBS | BODY MASS INDEX: 27.41 KG/M2

## 2023-09-20 DIAGNOSIS — G43.909 MIGRAINE WITHOUT STATUS MIGRAINOSUS, NOT INTRACTABLE, UNSPECIFIED MIGRAINE TYPE: Primary | ICD-10-CM

## 2023-09-20 PROCEDURE — 96413 CHEMO IV INFUSION 1 HR: CPT

## 2023-09-20 PROCEDURE — 6360000002 HC RX W HCPCS: Performed by: PSYCHIATRY & NEUROLOGY

## 2023-09-20 PROCEDURE — 2580000003 HC RX 258: Performed by: PSYCHIATRY & NEUROLOGY

## 2023-09-20 RX ORDER — SODIUM CHLORIDE 9 MG/ML
INJECTION, SOLUTION INTRAVENOUS CONTINUOUS
OUTPATIENT
Start: 2023-12-13

## 2023-09-20 RX ORDER — CLONAZEPAM 2 MG/1
TABLET ORAL
COMMUNITY
Start: 2023-09-14

## 2023-09-20 RX ORDER — LIDOCAINE 50 MG/G
1 PATCH TOPICAL DAILY
COMMUNITY
Start: 2023-08-22

## 2023-09-20 RX ORDER — SERTRALINE HYDROCHLORIDE 100 MG/1
200 TABLET, FILM COATED ORAL DAILY
COMMUNITY

## 2023-09-20 RX ORDER — MEMANTINE HYDROCHLORIDE 10 MG/1
TABLET ORAL
COMMUNITY
Start: 2023-09-19

## 2023-09-20 RX ORDER — MEMANTINE HYDROCHLORIDE 10 MG/1
1 TABLET ORAL 2 TIMES DAILY
COMMUNITY
Start: 2023-09-19

## 2023-09-20 RX ORDER — INDOMETHACIN 50 MG/1
CAPSULE ORAL
COMMUNITY

## 2023-09-20 RX ORDER — FROVATRIPTAN SUCCINATE 2.5 MG/1
TABLET, FILM COATED ORAL
COMMUNITY
Start: 2022-07-29

## 2023-09-20 RX ORDER — DIVALPROEX SODIUM 500 MG/1
TABLET, DELAYED RELEASE ORAL
COMMUNITY
Start: 2023-08-25

## 2023-09-20 RX ORDER — ONDANSETRON 4 MG/1
TABLET, ORALLY DISINTEGRATING ORAL
COMMUNITY
Start: 2023-06-19

## 2023-09-20 RX ADMIN — EPTINEZUMAB-JJMR 100 MG: 100 INJECTION INTRAVENOUS at 14:04

## 2023-09-20 ASSESSMENT — PAIN DESCRIPTION - DESCRIPTORS: DESCRIPTORS: ACHING

## 2023-09-20 ASSESSMENT — PAIN SCALES - GENERAL: PAINLEVEL_OUTOF10: 2

## 2023-09-20 ASSESSMENT — PAIN DESCRIPTION - ORIENTATION: ORIENTATION: MID

## 2023-09-20 ASSESSMENT — PAIN DESCRIPTION - LOCATION: LOCATION: HEAD

## 2023-12-18 ENCOUNTER — HOSPITAL ENCOUNTER (OUTPATIENT)
Facility: HOSPITAL | Age: 33
Setting detail: INFUSION SERIES
Discharge: HOME | End: 2023-12-18
Payer: MEDICAID

## 2023-12-18 VITALS
HEART RATE: 78 BPM | SYSTOLIC BLOOD PRESSURE: 106 MMHG | TEMPERATURE: 98.2 F | DIASTOLIC BLOOD PRESSURE: 69 MMHG | OXYGEN SATURATION: 95 % | RESPIRATION RATE: 18 BRPM

## 2023-12-18 DIAGNOSIS — G43.909 MIGRAINE WITHOUT STATUS MIGRAINOSUS, NOT INTRACTABLE, UNSPECIFIED MIGRAINE TYPE: Primary | ICD-10-CM

## 2023-12-18 PROCEDURE — 6360000002 HC RX W HCPCS: Performed by: PSYCHIATRY & NEUROLOGY

## 2023-12-18 PROCEDURE — 96413 CHEMO IV INFUSION 1 HR: CPT

## 2023-12-18 PROCEDURE — 2580000003 HC RX 258: Performed by: PSYCHIATRY & NEUROLOGY

## 2023-12-18 RX ORDER — SODIUM CHLORIDE 9 MG/ML
INJECTION, SOLUTION INTRAVENOUS CONTINUOUS
OUTPATIENT
Start: 2023-12-19

## 2023-12-18 RX ORDER — SODIUM CHLORIDE 9 MG/ML
INJECTION, SOLUTION INTRAVENOUS CONTINUOUS
Status: DISCONTINUED | OUTPATIENT
Start: 2023-12-18 | End: 2023-12-19 | Stop reason: HOSPADM

## 2023-12-18 RX ADMIN — EPTINEZUMAB-JJMR 100 MG: 100 INJECTION INTRAVENOUS at 14:13

## 2023-12-18 RX ADMIN — SODIUM CHLORIDE: 9 INJECTION, SOLUTION INTRAVENOUS at 14:11

## 2023-12-18 NOTE — PROGRESS NOTES
[facility] Progress Note    Date: 2023    Name: Akanksha Mcdonough    MRN: [id number]         : 1990    Mr. Mandy Huffman Arrived ambulatory and in no distress for Vyepti Infusion. Assessment was completed, no acute issues at this time, no new complaints voiced. 24 G PIV established to left arm, + blood return. Mr. Christine's vitals were reviewed. Vitals:    23 1452   BP: 106/69   Pulse:    Resp:    Temp:    SpO2:        Medications:  Medications Administered         0.9 % sodium chloride infusion Admin Date  2023 Action  New Bag Dose   Rate  25 mL/hr Route  IntraVENous Administered By  Janet Ramirez RN        eptinezumab-jjmr (VYEPTI) 100 mg in sodium chloride 0.9 % 111 mL IVPB Admin Date  2023 Action  New Bag Dose  100 mg Rate  222 mL/hr Route  IntraVENous Administered By  Janet Ramirez RN              Mr. Mandy Huffman tolerated treatment well and was discharged from [address] [place] [address] in stable condition. PIV flushed & removed. No future appointments at this time.

## 2024-03-28 ENCOUNTER — HOSPITAL ENCOUNTER (OUTPATIENT)
Facility: HOSPITAL | Age: 34
Setting detail: INFUSION SERIES
End: 2024-03-28
Payer: MEDICAID

## 2024-03-29 ENCOUNTER — HOSPITAL ENCOUNTER (OUTPATIENT)
Facility: HOSPITAL | Age: 34
Setting detail: INFUSION SERIES
End: 2024-03-29
Payer: MEDICAID

## 2024-03-29 ENCOUNTER — APPOINTMENT (OUTPATIENT)
Facility: HOSPITAL | Age: 34
End: 2024-03-29
Payer: MEDICAID

## 2024-04-01 ENCOUNTER — HOSPITAL ENCOUNTER (OUTPATIENT)
Facility: HOSPITAL | Age: 34
Setting detail: INFUSION SERIES
Discharge: HOME | End: 2024-04-01
Payer: MEDICAID

## 2024-04-01 VITALS
TEMPERATURE: 98.1 F | SYSTOLIC BLOOD PRESSURE: 94 MMHG | RESPIRATION RATE: 18 BRPM | DIASTOLIC BLOOD PRESSURE: 61 MMHG | HEART RATE: 90 BPM

## 2024-04-01 DIAGNOSIS — G43.909 MIGRAINE WITHOUT STATUS MIGRAINOSUS, NOT INTRACTABLE, UNSPECIFIED MIGRAINE TYPE: Primary | ICD-10-CM

## 2024-04-01 PROCEDURE — 2580000003 HC RX 258: Performed by: PSYCHIATRY & NEUROLOGY

## 2024-04-01 PROCEDURE — 96365 THER/PROPH/DIAG IV INF INIT: CPT

## 2024-04-01 PROCEDURE — 6360000002 HC RX W HCPCS: Performed by: PSYCHIATRY & NEUROLOGY

## 2024-04-01 RX ORDER — SODIUM CHLORIDE 9 MG/ML
INJECTION, SOLUTION INTRAVENOUS CONTINUOUS
OUTPATIENT
Start: 2024-06-24

## 2024-04-01 RX ADMIN — EPTINEZUMAB-JJMR 100 MG: 100 INJECTION INTRAVENOUS at 16:20

## 2024-04-01 ASSESSMENT — PAIN DESCRIPTION - LOCATION: LOCATION: HEAD

## 2024-04-01 ASSESSMENT — PAIN DESCRIPTION - ORIENTATION: ORIENTATION: MID

## 2024-04-01 ASSESSMENT — PAIN DESCRIPTION - DESCRIPTORS: DESCRIPTORS: ACHING

## 2024-04-01 ASSESSMENT — PAIN SCALES - GENERAL: PAINLEVEL_OUTOF10: 2

## 2024-04-01 NOTE — PROGRESS NOTES
[facility] Progress Note    Date: 2024    Name: Tate Christine    MRN: [id number]         : 1990    Mr. Christine Arrived ambulatory and in no distress for Vyepti Infusion.  Assessment was completed, no acute issues at this time, no new complaints voiced.  24 G PIV established to right arm, + blood return.     Mr. Christine's vitals were reviewed.  Vitals:    24 1645   BP: 94/61   Pulse:    Resp: 18   Temp:        Medications:  Medications Administered         eptinezumab-jjmr (VYEPTI) 100 mg in sodium chloride 0.9 % 111 mL IVPB Admin Date  2024 Action  New Bag Dose  100 mg Rate  222 mL/hr Route  IntraVENous Administered By  Vicky Mccormack, MITA            Mr. Christine tolerated treatment well and was [facility] condition.   PIV flushed & removed. Patient is aware of future appointments.    Future Appointments   Date Time Provider Department Center   2024  2:00 PM SS INF4 [facility]   2024  2:00 PM SS INF4 [facility] >4[facility]       Vicky Mccormack RN  2024

## 2024-08-26 ENCOUNTER — HOSPITAL ENCOUNTER (OUTPATIENT)
Facility: HOSPITAL | Age: 34
Setting detail: INFUSION SERIES
End: 2024-08-26

## 2024-08-29 ENCOUNTER — HOSPITAL ENCOUNTER (OUTPATIENT)
Facility: HOSPITAL | Age: 34
Setting detail: INFUSION SERIES
End: 2024-08-29

## 2024-09-16 ENCOUNTER — HOSPITAL ENCOUNTER (OUTPATIENT)
Facility: HOSPITAL | Age: 34
Setting detail: INFUSION SERIES
End: 2024-09-16

## 2024-10-18 ENCOUNTER — HOSPITAL ENCOUNTER (OUTPATIENT)
Facility: HOSPITAL | Age: 34
Setting detail: INFUSION SERIES
Discharge: HOME | End: 2024-10-18
Payer: MEDICAID

## 2024-10-18 VITALS
HEIGHT: 71 IN | SYSTOLIC BLOOD PRESSURE: 116 MMHG | HEART RATE: 72 BPM | DIASTOLIC BLOOD PRESSURE: 73 MMHG | TEMPERATURE: 97.4 F | OXYGEN SATURATION: 98 % | RESPIRATION RATE: 17 BRPM | WEIGHT: 182.7 LBS | BODY MASS INDEX: 25.58 KG/M2

## 2024-10-18 DIAGNOSIS — G43.909 MIGRAINE WITHOUT STATUS MIGRAINOSUS, NOT INTRACTABLE, UNSPECIFIED MIGRAINE TYPE: Primary | ICD-10-CM

## 2024-10-18 PROCEDURE — 2580000003 HC RX 258: Performed by: PSYCHIATRY & NEUROLOGY

## 2024-10-18 PROCEDURE — 6360000002 HC RX W HCPCS: Performed by: PSYCHIATRY & NEUROLOGY

## 2024-10-18 PROCEDURE — 96365 THER/PROPH/DIAG IV INF INIT: CPT

## 2024-10-18 RX ORDER — SODIUM CHLORIDE 9 MG/ML
5-250 INJECTION, SOLUTION INTRAVENOUS PRN
OUTPATIENT
Start: 2024-11-05

## 2024-10-18 RX ORDER — SODIUM CHLORIDE 0.9 % (FLUSH) 0.9 %
5-40 SYRINGE (ML) INJECTION PRN
OUTPATIENT
Start: 2024-11-05

## 2024-10-18 RX ADMIN — EPTINEZUMAB-JJMR 300 MG: 100 INJECTION INTRAVENOUS at 11:20

## 2024-10-18 ASSESSMENT — PAIN DESCRIPTION - ORIENTATION: ORIENTATION: RIGHT

## 2024-10-18 ASSESSMENT — PAIN SCALES - GENERAL: PAINLEVEL_OUTOF10: 7

## 2024-10-18 ASSESSMENT — PAIN DESCRIPTION - LOCATION: LOCATION: ABDOMEN

## 2024-10-18 ASSESSMENT — PAIN DESCRIPTION - DESCRIPTORS: DESCRIPTORS: ACHING

## 2024-10-18 NOTE — PROGRESS NOTES
[facility] Progress Note    Date: October 18, 2024        1030: Pt arrived ambulatory to [facility] for Vyepti infusion in stable condition.  Assessment completed. PIV placed to Left AC with positive blood return. Criteria for treatment was met.    Patient stated he had gallbladder surgery and was having abdominal pain.     Patient Vitals for the past 12 hrs:   Temp Pulse Resp BP SpO2   10/18/24 1156 -- 72 17 116/73 --   10/18/24 1040 97.4 °F (36.3 °C) 70 17 (!) 98/54 98 %         Medications Administered         eptinezumab-jjmr (VYEPTI) 300 mg in sodium chloride 0.9 % 113 mL IVPB Admin Date  10/18/2024 Action  New Bag Dose  300 mg Rate  226 mL/hr Route  IntraVENous Documented By  Jayda Flores, RN               Mr. Christine tolerated the infusion, and had no complaints.    PIV flushed and removed. 2x2 and coban placed    Mr. Christine was discharged [facility] in stable condition. Patient is aware of next scheduled [facility] appointment 12/23/24 at 10:30 AM.         Future Appointments   Date Time Provider Department Center   12/23/2024 10:30 [facility]   3/21/2025 11:00 AM [facility]   6/20/2025 11:00 AM SS FASTTRACK 4 [facility]         Jayda Flores, MITA, RN  October 18, 2024

## 2024-10-25 ENCOUNTER — APPOINTMENT (OUTPATIENT)
Facility: HOSPITAL | Age: 34
End: 2024-10-25
Payer: MEDICAID

## 2025-01-10 ENCOUNTER — HOSPITAL ENCOUNTER (OUTPATIENT)
Facility: HOSPITAL | Age: 35
Setting detail: INFUSION SERIES
Discharge: HOME | End: 2025-01-10
Payer: MEDICAID

## 2025-01-10 VITALS
WEIGHT: 177.5 LBS | BODY MASS INDEX: 24.85 KG/M2 | OXYGEN SATURATION: 92 % | HEIGHT: 71 IN | TEMPERATURE: 98 F | DIASTOLIC BLOOD PRESSURE: 74 MMHG | RESPIRATION RATE: 18 BRPM | HEART RATE: 77 BPM | SYSTOLIC BLOOD PRESSURE: 110 MMHG

## 2025-01-10 DIAGNOSIS — G43.909 MIGRAINE WITHOUT STATUS MIGRAINOSUS, NOT INTRACTABLE, UNSPECIFIED MIGRAINE TYPE: Primary | ICD-10-CM

## 2025-01-10 PROCEDURE — 6360000002 HC RX W HCPCS: Performed by: PSYCHIATRY & NEUROLOGY

## 2025-01-10 PROCEDURE — 2580000003 HC RX 258: Performed by: PSYCHIATRY & NEUROLOGY

## 2025-01-10 PROCEDURE — 96374 THER/PROPH/DIAG INJ IV PUSH: CPT

## 2025-01-10 RX ORDER — SODIUM CHLORIDE 9 MG/ML
5-250 INJECTION, SOLUTION INTRAVENOUS PRN
OUTPATIENT
Start: 2025-04-04

## 2025-01-10 RX ORDER — SODIUM CHLORIDE 0.9 % (FLUSH) 0.9 %
5-40 SYRINGE (ML) INJECTION PRN
OUTPATIENT
Start: 2025-04-04

## 2025-01-10 RX ADMIN — EPTINEZUMAB-JJMR 300 MG: 100 INJECTION INTRAVENOUS at 15:51

## 2025-01-10 ASSESSMENT — PAIN SCALES - GENERAL: PAINLEVEL_OUTOF10: 2

## 2025-01-10 NOTE — PROGRESS NOTES
[facility] Progress Note    Date: January 10, 2025    Name: Tate Christine    MRN: [id number]         : 1990    Mr. Christine Arrived ambulatory and in no distress for Vyepti Infusion.  Assessment was completed, no acute issues at this time. 24 G PIV established to left arm, + blood return.     Mr. Christine's vitals were reviewed.  Vitals:    01/10/25 1530   BP: 122/82   Pulse: 77   Resp: 18   Temp: 98 °F (36.7 °C)   SpO2: 92%       Medications:  Medications Administered         eptinezumab-jjmr (VYEPTI) 300 mg in sodium chloride 0.9 % 113 mL IVPB Admin Date  01/10/2025 Action  New Bag Dose  300 mg Rate  226 mL/hr Route  IntraVENous Documented By  Vicky Mccormack, RN            Mr. Christine tolerated treatment well and was discharged from [facility] in stable condition.   PIV flushed & removed. Patient is aware of future appointments.    Future Appointments   Date Time Provider Department Center   2025  3:30 PM SS FASTTRACK 3 [facility]   7/10/2025  3:30 PM SS FASTTRACK 3 [facility]       Vicky Mccormack RN  January 10, 2025

## 2025-04-11 ENCOUNTER — HOSPITAL ENCOUNTER (OUTPATIENT)
Facility: HOSPITAL | Age: 35
Setting detail: INFUSION SERIES
Discharge: HOME | End: 2025-04-11
Payer: MEDICAID

## 2025-04-11 VITALS
WEIGHT: 188.7 LBS | BODY MASS INDEX: 26.42 KG/M2 | HEART RATE: 71 BPM | HEIGHT: 71 IN | OXYGEN SATURATION: 96 % | RESPIRATION RATE: 18 BRPM | SYSTOLIC BLOOD PRESSURE: 105 MMHG | TEMPERATURE: 98.2 F | DIASTOLIC BLOOD PRESSURE: 58 MMHG

## 2025-04-11 DIAGNOSIS — G43.909 MIGRAINE WITHOUT STATUS MIGRAINOSUS, NOT INTRACTABLE, UNSPECIFIED MIGRAINE TYPE: Primary | ICD-10-CM

## 2025-04-11 PROCEDURE — 96365 THER/PROPH/DIAG IV INF INIT: CPT

## 2025-04-11 PROCEDURE — 6360000002 HC RX W HCPCS: Performed by: PSYCHIATRY & NEUROLOGY

## 2025-04-11 PROCEDURE — 2580000003 HC RX 258: Performed by: PSYCHIATRY & NEUROLOGY

## 2025-04-11 RX ORDER — SODIUM CHLORIDE 9 MG/ML
5-250 INJECTION, SOLUTION INTRAVENOUS PRN
OUTPATIENT
Start: 2025-06-27

## 2025-04-11 RX ORDER — SODIUM CHLORIDE 0.9 % (FLUSH) 0.9 %
5-40 SYRINGE (ML) INJECTION PRN
OUTPATIENT
Start: 2025-06-27

## 2025-04-11 RX ADMIN — EPTINEZUMAB-JJMR 300 MG: 100 INJECTION INTRAVENOUS at 16:24

## 2025-04-11 ASSESSMENT — PAIN DESCRIPTION - LOCATION: LOCATION: HEAD

## 2025-04-11 ASSESSMENT — PAIN DESCRIPTION - DESCRIPTORS: DESCRIPTORS: THROBBING

## 2025-04-11 ASSESSMENT — PAIN DESCRIPTION - ORIENTATION: ORIENTATION: LEFT

## 2025-04-11 ASSESSMENT — PAIN SCALES - GENERAL: PAINLEVEL_OUTOF10: 7

## 2025-04-11 NOTE — PROGRESS NOTES
[facility] Progress Note    Date: April 11, 2025        Pt arrived ambulatory to [facility] for Vyepti in stable condition.  Assessment completed. PIV placed to left wrist with positive blood return.       Patient Vitals for the past 12 hrs:   Temp Pulse Resp BP SpO2   04/11/25 1530 98.2 °F (36.8 °C) 81 18 (!) 119/59 96 %       Medications Administered         eptinezumab-jjmr (VYEPTI) 300 mg in sodium chloride 0.9 % 113 mL IVPB Admin Date  04/11/2025 Action  New Bag Dose  300 mg Rate  226 mL/hr Route  IntraVENous Documented By  Colette Ingram, RN                  [id number]:  Tolerated treatment well, no adverse reactions noted. PIV flushed and removed. 2x2 and coban placed. D/Cd from [facility] ambulatory and in no distress.  Patient is aware of next scheduled [facility] appointment on 07/10/25.    Future Appointments   Date Time Provider Department Center   7/10/2025  3:30 PM SS FASTTRACK 3 [facility]           Colette Ingram RN  April 11, 2025

## 2025-06-20 ENCOUNTER — APPOINTMENT (OUTPATIENT)
Facility: HOSPITAL | Age: 35
End: 2025-06-20
Payer: MEDICAID

## 2025-07-10 ENCOUNTER — HOSPITAL ENCOUNTER (OUTPATIENT)
Facility: HOSPITAL | Age: 35
Setting detail: INFUSION SERIES
End: 2025-07-10